# Patient Record
Sex: MALE | Race: WHITE | Employment: FULL TIME | URBAN - METROPOLITAN AREA
[De-identification: names, ages, dates, MRNs, and addresses within clinical notes are randomized per-mention and may not be internally consistent; named-entity substitution may affect disease eponyms.]

---

## 2017-06-15 DIAGNOSIS — I42.8 NONISCHEMIC CARDIOMYOPATHY (HCC): ICD-10-CM

## 2017-06-15 DIAGNOSIS — I10 ESSENTIAL HYPERTENSION: ICD-10-CM

## 2017-06-15 RX ORDER — LISINOPRIL 10 MG/1
TABLET ORAL
Qty: 180 TABLET | Refills: 1 | Status: SHIPPED | OUTPATIENT
Start: 2017-06-15 | End: 2017-12-31 | Stop reason: SDUPTHER

## 2017-08-15 LAB
ALBUMIN SERPL-MCNC: 4.4 G/DL
ALP BLD-CCNC: 58 U/L
ALT SERPL-CCNC: 22 U/L
ANION GAP SERPL CALCULATED.3IONS-SCNC: NORMAL MMOL/L
AST SERPL-CCNC: 17 U/L
AVERAGE GLUCOSE: NORMAL
BILIRUB SERPL-MCNC: 0.6 MG/DL (ref 0.1–1.4)
BUN BLDV-MCNC: 17 MG/DL
CALCIUM SERPL-MCNC: 9.2 MG/DL
CHLORIDE BLD-SCNC: 103 MMOL/L
CHOLESTEROL, TOTAL: 152 MG/DL
CHOLESTEROL/HDL RATIO: NORMAL
CO2: NORMAL MMOL/L
CREAT SERPL-MCNC: 0.77 MG/DL
GFR CALCULATED: NORMAL
GLUCOSE BLD-MCNC: 103 MG/DL
HBA1C MFR BLD: 5.9 %
HCT VFR BLD CALC: 40.8 % (ref 41–53)
HDLC SERPL-MCNC: 48 MG/DL (ref 35–70)
HEMOGLOBIN: 13.9 G/DL (ref 13.5–17.5)
LDL CHOLESTEROL CALCULATED: 79 MG/DL (ref 0–160)
PLATELET # BLD: 274 K/ΜL
POTASSIUM SERPL-SCNC: 5 MMOL/L
SODIUM BLD-SCNC: 143 MMOL/L
TOTAL PROTEIN: 6.8
TRIGL SERPL-MCNC: 126 MG/DL
VLDLC SERPL CALC-MCNC: NORMAL MG/DL
WBC # BLD: 8.5 10^3/ML

## 2017-08-18 ENCOUNTER — TELEPHONE (OUTPATIENT)
Dept: CARDIOLOGY CLINIC | Age: 47
End: 2017-08-18

## 2017-11-17 ENCOUNTER — OFFICE VISIT (OUTPATIENT)
Dept: CARDIOLOGY CLINIC | Age: 47
End: 2017-11-17

## 2017-11-17 VITALS
HEART RATE: 75 BPM | HEIGHT: 72 IN | DIASTOLIC BLOOD PRESSURE: 58 MMHG | WEIGHT: 169 LBS | OXYGEN SATURATION: 96 % | BODY MASS INDEX: 22.89 KG/M2 | SYSTOLIC BLOOD PRESSURE: 92 MMHG

## 2017-11-17 DIAGNOSIS — E78.2 MIXED HYPERLIPIDEMIA: ICD-10-CM

## 2017-11-17 DIAGNOSIS — I10 ESSENTIAL HYPERTENSION: Primary | ICD-10-CM

## 2017-11-17 DIAGNOSIS — I25.10 CORONARY ARTERY DISEASE INVOLVING NATIVE CORONARY ARTERY OF NATIVE HEART WITHOUT ANGINA PECTORIS: ICD-10-CM

## 2017-11-17 DIAGNOSIS — I42.8 NONISCHEMIC CARDIOMYOPATHY (HCC): ICD-10-CM

## 2017-11-17 PROCEDURE — 99214 OFFICE O/P EST MOD 30 MIN: CPT | Performed by: INTERNAL MEDICINE

## 2017-11-17 RX ORDER — VARENICLINE TARTRATE 25 MG
KIT ORAL
Qty: 1 EACH | Refills: 0 | Status: SHIPPED | OUTPATIENT
Start: 2017-11-17

## 2017-11-17 RX ORDER — VARENICLINE TARTRATE 1 MG/1
1 TABLET, FILM COATED ORAL 2 TIMES DAILY
Qty: 60 TABLET | Refills: 3 | Status: SHIPPED | OUTPATIENT
Start: 2017-11-17

## 2017-11-17 NOTE — PROGRESS NOTES
[facility]   Cardiac Followup    Referring Provider:  Ana Lyle MD     Chief Complaint   Patient presents with    Coronary Artery Disease    Hypertension    Hyperlipidemia        History of Present Illness: Follow up nonischemic cardiomyopathy, smoking, HTN. Feels well overall. He continues to smoke and drink alcohol. He does not check his BP at home. Tolerates meds well. Denies CP, syncope, dizziness, palps or SOB. Not exercising much although active on job. Past Medical History:   has a past medical history of Bell's palsy and Seasonal allergies. Surgical History:   has a past surgical history that includes hernia repair; Tonsillectomy; and Sinus endoscopy (7/20/2010). Social History:   reports that he has been smoking Cigarettes. He has a 11.00 pack-year smoking history. He has never used smokeless tobacco. He reports that he drinks about 2.4 oz of alcohol per week . Family History:  family history includes Depression in his father. Home Medications:  Prior to Admission medications    Medication Sig Start Date End Date Taking? Authorizing Provider   lisinopril (PRINIVIL;ZESTRIL) 10 MG tablet TAKE ONE TABLET BY MOUTH TWICE A DAY 6/15/17  Yes Eleni Lucas MD   carvedilol (COREG) 12.5 MG tablet TAKE ONE TABLET BY MOUTH TWICE A DAY WITH MEALS 11/22/16  Yes Eleni Lucas MD   atorvastatin (LIPITOR) 80 MG tablet TAKE 1 TABLET BY MOUTH ONE TIME A DAY 11/22/16  Yes Eleni Lucas MD   aspirin 81 MG tablet Take 81 mg by mouth daily. Yes Historical Provider, MD   Multiple Vitamins-Minerals (THERAPEUTIC MULTIVITAMIN-MINERALS) tablet Take 1 tablet by mouth daily. Yes Historical Provider, MD   Omega-3 Fatty Acids (FISH OIL) 1000 MG CAPS Take 1,000 mg by mouth daily. Yes Historical Provider, MD        Allergies:  Review of patient's allergies indicates no known allergies. Review of Systems:   · Constitutional: there has been no unanticipated weight loss. spheres  · Moves all extremities well  · Exhibits normal gait balance and coordination  · No abnormalities of mood, affect, memory, mentation, or behavior are noted    Cardiac cath 3/2014  LM 20%   LAD 50% mid, FFR 0.88   Cx 20%   RCA <20%   LVEF: global hypokinesis, EF 30-35%   Nonischemic cardiomyopathy    Echo 3/2014  Global ejection fraction is moderately decreased and estimated at 40 %. Abnormal (paradoxical) septal motion is present likely due to left bundle  branch block . No significant valvular abnormalities. Echo 6/13/2014  Normal left ventricle size and wall thickness. The left ventricular systolic function is moderately reduced with an  ejection fraction of 40-45%. There is abnormal (paradoxical) septal motion consistent with left bundle  branch block. Echo 8/2015  Normal left ventricle size and wall thickness. The left ventricular systolic function is mildly reduced with an ejection  fraction of 45 %. Mild global hypokinesis. Diastolic filling parameters suggests normal diastolic function. Systolic pulmonary artery pressure (SPAP) is normal and estimated at 23   MmHg (RA pressure 3 mmHg). Echo 11/2016   Normal left ventricle size, wall thickness and systolic function with an   estimated ejection fraction of 50-55%.   No regional wall motion abnormalities seen.   Diastolic filling parameters suggest normal diastolic filing pressure.   Systolic pulmonary artery pressure (SPAP) is normal and estimated at 24 mmHg   (RA pressure 3 mmHg). Assessment:   1. Nonischemic cardiomyopathy - resolved. EF returned to normal     2. CAD (coronary artery disease) - nonobstructive. No anginal symptoms. stable   3. S/P cardiac catheterization  - stable   4. Chest pain - no recurrence   5. HLD - well controlled. Results reviewed  6. Smoker - cessation again discussed. Plan:   Will prescribe chantix   Recommend abstaining from alcohol and tobacco   Continue exercising and following a healthy

## 2017-11-17 NOTE — LETTER
[facility] - [address]  Phone: [phone]  Fax: [phone]    Marli Araujo MD        November 20, 2017     Edd Chino MD  [place] [address], [address]    Patient: Edgard Iverson  MR Number: [id number]  YOB: 1970  Date of Visit: 11/17/2017    Dear Dr. Edd Chino:      Torito Evans    Referring Provider:  Edd Chino MD     Chief Complaint   Patient presents with    Coronary Artery Disease    Hypertension    Hyperlipidemia        History of Present Illness: Follow up nonischemic cardiomyopathy, smoking, HTN. Feels well overall. He continues to smoke and drink alcohol. He does not check his BP at home. Tolerates meds well. Denies CP, syncope, dizziness, palps or SOB. Not exercising much although active on job. Past Medical History:   has a past medical history of Bell's palsy and Seasonal allergies. Surgical History:   has a past surgical history that includes hernia repair; Tonsillectomy; and Sinus endoscopy (7/20/2010). Social History:   reports that he has been smoking Cigarettes. He has a 11.00 pack-year smoking history. He has never used smokeless tobacco. He reports that he drinks about 2.4 oz of alcohol per week . Family History:  family history includes Depression in his father. Home Medications:  Prior to Admission medications    Medication Sig Start Date End Date Taking? Authorizing Provider   lisinopril (PRINIVIL;ZESTRIL) 10 MG tablet TAKE ONE TABLET BY MOUTH TWICE A DAY 6/15/17  Yes Marli Araujo MD   carvedilol (COREG) 12.5 MG tablet TAKE ONE TABLET BY MOUTH TWICE A DAY WITH MEALS 11/22/16  Yes Marli Araujo MD   atorvastatin (LIPITOR) 80 MG tablet TAKE 1 TABLET BY MOUTH ONE TIME A DAY 11/22/16  Yes Marli Araujo MD   aspirin 81 MG tablet Take 81 mg by mouth daily.    Yes Historical Provider, MD · The carotid upstroke is normal in amplitude and contour without delay or bruit  · Normal S1S2, No S3, No Murmur  · Peripheral pulses are symmetrical and full  · There is no clubbing, cyanosis of the extremities. · No edema  · Femoral Arteries: 2+ and equal  · Pedal Pulses: 2+ and equal   Abdomen:  · No masses or tenderness  · Liver/Spleen: No Abnormalities Noted  Neurological/Psychiatric:  · Alert and oriented in all spheres  · Moves all extremities well  · Exhibits normal gait balance and coordination  · No abnormalities of mood, affect, memory, mentation, or behavior are noted    Cardiac cath 3/2014  LM 20%   LAD 50% mid, FFR 0.88   Cx 20%   RCA <20%   LVEF: global hypokinesis, EF 30-35%   Nonischemic cardiomyopathy    Echo 3/2014  Global ejection fraction is moderately decreased and estimated at 40 %. Abnormal (paradoxical) septal motion is present likely due to left bundle  branch block . No significant valvular abnormalities. Echo 6/13/2014  Normal left ventricle size and wall thickness. The left ventricular systolic function is moderately reduced with an  ejection fraction of 40-45%. There is abnormal (paradoxical) septal motion consistent with left bundle  branch block. Echo 8/2015  Normal left ventricle size and wall thickness. The left ventricular systolic function is mildly reduced with an ejection  fraction of 45 %. Mild global hypokinesis. Diastolic filling parameters suggests normal diastolic function. Systolic pulmonary artery pressure (SPAP) is normal and estimated at 23   MmHg (RA pressure 3 mmHg). Echo 11/2016   Normal left ventricle size, wall thickness and systolic function with an   estimated ejection fraction of 50-55%.   No regional wall motion abnormalities seen.   Diastolic filling parameters suggest normal diastolic filing pressure.   Systolic pulmonary artery pressure (SPAP) is normal and estimated at 24 mmHg   (RA pressure 3 mmHg).     Assessment: 1. Nonischemic cardiomyopathy - resolved. EF returned to normal     2. CAD (coronary artery disease) - nonobstructive. No anginal symptoms. stable   3. S/P cardiac catheterization  - stable   4. Chest pain - no recurrence   5. HLD - well controlled. Results reviewed  6. Smoker - cessation again discussed. Plan: Will prescribe chantix   Recommend abstaining from alcohol and tobacco   Continue exercising and following a healthy diet  Continue current medication regimen. No titration due to low BP. Monitor BP at home  F/U with me in 1 year       Esperanza Will. Magaly Merrill M.D., Jesus Aleman        If you have questions, please do not hesitate to call me. I look forward to following [facility] along with you.     Sincerely,        Marli Araujo MD

## 2017-11-20 NOTE — COMMUNICATION BODY
[address]   Cardiac Followup    Referring Provider:  Bhargav Santo MD     Chief Complaint   Patient presents with    Coronary Artery Disease    Hypertension    Hyperlipidemia        History of Present Illness: Follow up nonischemic cardiomyopathy, smoking, HTN. Feels well overall. He continues to smoke and drink alcohol. He does not check his BP at home. Tolerates meds well. Denies CP, syncope, dizziness, palps or SOB. Not exercising much although active on job. Past Medical History:   has a past medical history of Bell's palsy and Seasonal allergies. Surgical History:   has a past surgical history that includes hernia repair; Tonsillectomy; and Sinus endoscopy (7/20/2010). Social History:   reports that he has been smoking Cigarettes. He has a 11.00 pack-year smoking history. He has never used smokeless tobacco. He reports that he drinks about 2.4 oz of alcohol per week . Family History:  family history includes Depression in his father. Home Medications:  Prior to Admission medications    Medication Sig Start Date End Date Taking? Authorizing Provider   lisinopril (PRINIVIL;ZESTRIL) 10 MG tablet TAKE ONE TABLET BY MOUTH TWICE A DAY 6/15/17  Yes Barry Collazo MD   carvedilol (COREG) 12.5 MG tablet TAKE ONE TABLET BY MOUTH TWICE A DAY WITH MEALS 11/22/16  Yes Barry Collazo MD   atorvastatin (LIPITOR) 80 MG tablet TAKE 1 TABLET BY MOUTH ONE TIME A DAY 11/22/16  Yes Barry Collazo MD   aspirin 81 MG tablet Take 81 mg by mouth daily. Yes Historical Provider, MD   Multiple Vitamins-Minerals (THERAPEUTIC MULTIVITAMIN-MINERALS) tablet Take 1 tablet by mouth daily. Yes Historical Provider, MD   Omega-3 Fatty Acids (FISH OIL) 1000 MG CAPS Take 1,000 mg by mouth daily. Yes Historical Provider, MD        Allergies:  Review of patient's allergies indicates no known allergies. Review of Systems:   · Constitutional: there has been no unanticipated weight loss. diet  Continue current medication regimen. No titration due to low BP. Monitor BP at home  F/U with me in 1 year       Kirstie Jo.  Winnie Watters M.D., Arnol San

## 2017-12-07 DIAGNOSIS — I42.8 NONISCHEMIC CARDIOMYOPATHY (HCC): ICD-10-CM

## 2017-12-08 RX ORDER — CARVEDILOL 12.5 MG/1
TABLET ORAL
Qty: 180 TABLET | Refills: 3 | Status: SHIPPED | OUTPATIENT
Start: 2017-12-08 | End: 2018-12-17 | Stop reason: SDUPTHER

## 2017-12-31 DIAGNOSIS — I10 ESSENTIAL HYPERTENSION: ICD-10-CM

## 2017-12-31 DIAGNOSIS — I42.8 NONISCHEMIC CARDIOMYOPATHY (HCC): ICD-10-CM

## 2018-01-02 RX ORDER — LISINOPRIL 10 MG/1
TABLET ORAL
Qty: 180 TABLET | Refills: 3 | Status: SHIPPED | OUTPATIENT
Start: 2018-01-02 | End: 2019-02-20 | Stop reason: SDUPTHER

## 2018-01-17 RX ORDER — ATORVASTATIN CALCIUM 80 MG/1
TABLET, FILM COATED ORAL
Qty: 90 TABLET | Refills: 3 | Status: SHIPPED | OUTPATIENT
Start: 2018-01-17 | End: 2019-03-05 | Stop reason: SDUPTHER

## 2018-02-28 ENCOUNTER — TELEPHONE (OUTPATIENT)
Dept: CARDIOLOGY CLINIC | Age: 48
End: 2018-02-28

## 2018-12-17 DIAGNOSIS — I42.8 NONISCHEMIC CARDIOMYOPATHY (HCC): ICD-10-CM

## 2018-12-17 RX ORDER — CARVEDILOL 12.5 MG/1
TABLET ORAL
Qty: 60 TABLET | Refills: 0 | Status: SHIPPED | OUTPATIENT
Start: 2018-12-17 | End: 2019-01-21 | Stop reason: SDUPTHER

## 2019-01-09 ENCOUNTER — OFFICE VISIT (OUTPATIENT)
Dept: CARDIOLOGY CLINIC | Age: 49
End: 2019-01-09
Payer: COMMERCIAL

## 2019-01-09 VITALS
BODY MASS INDEX: 25.8 KG/M2 | SYSTOLIC BLOOD PRESSURE: 100 MMHG | OXYGEN SATURATION: 97 % | HEART RATE: 75 BPM | DIASTOLIC BLOOD PRESSURE: 60 MMHG | WEIGHT: 190.2 LBS

## 2019-01-09 DIAGNOSIS — I42.8 NONISCHEMIC CARDIOMYOPATHY (HCC): ICD-10-CM

## 2019-01-09 DIAGNOSIS — I25.10 CORONARY ARTERY DISEASE INVOLVING NATIVE CORONARY ARTERY OF NATIVE HEART WITHOUT ANGINA PECTORIS: Primary | ICD-10-CM

## 2019-01-09 DIAGNOSIS — E78.2 MIXED HYPERLIPIDEMIA: ICD-10-CM

## 2019-01-09 DIAGNOSIS — I10 ESSENTIAL HYPERTENSION: ICD-10-CM

## 2019-01-09 PROCEDURE — 99214 OFFICE O/P EST MOD 30 MIN: CPT | Performed by: INTERNAL MEDICINE

## 2019-01-21 DIAGNOSIS — I42.8 NONISCHEMIC CARDIOMYOPATHY (HCC): ICD-10-CM

## 2019-01-22 RX ORDER — CARVEDILOL 12.5 MG/1
TABLET ORAL
Qty: 60 TABLET | Refills: 3 | Status: SHIPPED | OUTPATIENT
Start: 2019-01-22 | End: 2019-06-05 | Stop reason: SDUPTHER

## 2019-01-30 ENCOUNTER — TELEPHONE (OUTPATIENT)
Dept: CARDIOLOGY CLINIC | Age: 49
End: 2019-01-30

## 2019-02-20 ENCOUNTER — TELEPHONE (OUTPATIENT)
Dept: CARDIOLOGY CLINIC | Age: 49
End: 2019-02-20

## 2019-02-20 ENCOUNTER — HOSPITAL ENCOUNTER (OUTPATIENT)
Dept: CARDIOLOGY | Age: 49
Discharge: HOME | End: 2019-02-20
Payer: COMMERCIAL

## 2019-02-20 DIAGNOSIS — I42.8 NONISCHEMIC CARDIOMYOPATHY (HCC): ICD-10-CM

## 2019-02-20 DIAGNOSIS — I10 ESSENTIAL HYPERTENSION: ICD-10-CM

## 2019-02-20 LAB
LV EF: 53 %
LVEF MODALITY: NORMAL

## 2019-02-20 PROCEDURE — 93306 TTE W/DOPPLER COMPLETE: CPT

## 2019-02-20 RX ORDER — LISINOPRIL 10 MG/1
TABLET ORAL
Qty: 180 TABLET | Refills: 3 | Status: SHIPPED | OUTPATIENT
Start: 2019-02-20 | End: 2020-03-23

## 2019-02-25 ENCOUNTER — TELEPHONE (OUTPATIENT)
Dept: CARDIOLOGY CLINIC | Age: 49
End: 2019-02-25

## 2019-03-06 RX ORDER — ATORVASTATIN CALCIUM 80 MG/1
TABLET, FILM COATED ORAL
Qty: 90 TABLET | Refills: 3 | Status: SHIPPED | OUTPATIENT
Start: 2019-03-06 | End: 2020-01-16 | Stop reason: SDUPTHER

## 2019-06-05 DIAGNOSIS — I42.8 NONISCHEMIC CARDIOMYOPATHY (HCC): ICD-10-CM

## 2019-06-11 RX ORDER — CARVEDILOL 12.5 MG/1
TABLET ORAL
Qty: 180 TABLET | Refills: 2 | Status: SHIPPED | OUTPATIENT
Start: 2019-06-11 | End: 2020-04-07

## 2020-01-16 ENCOUNTER — OFFICE VISIT (OUTPATIENT)
Dept: CARDIOLOGY CLINIC | Age: 50
End: 2020-01-16
Payer: COMMERCIAL

## 2020-01-16 VITALS
HEART RATE: 87 BPM | BODY MASS INDEX: 26.76 KG/M2 | OXYGEN SATURATION: 97 % | SYSTOLIC BLOOD PRESSURE: 100 MMHG | DIASTOLIC BLOOD PRESSURE: 68 MMHG | HEIGHT: 72 IN | WEIGHT: 197.6 LBS

## 2020-01-16 PROCEDURE — 99214 OFFICE O/P EST MOD 30 MIN: CPT | Performed by: INTERNAL MEDICINE

## 2020-01-16 RX ORDER — ATORVASTATIN CALCIUM 80 MG/1
TABLET, FILM COATED ORAL
Qty: 90 TABLET | Refills: 3 | Status: SHIPPED | OUTPATIENT
Start: 2020-01-16 | End: 2021-01-11 | Stop reason: SDUPTHER

## 2020-01-16 NOTE — PROGRESS NOTES
varenicline (CHANTIX STARTING MONTH DESHAWN) 0.5 MG X 11 & 1 MG X 42 tablet Take by mouth. Patient not taking: Reported on 1/16/2020 11/17/17   Tori Cintron MD   varenicline (CHANTIX CONTINUING MONTH DESHAWN) 1 MG tablet Take 1 tablet by mouth 2 times daily  Patient not taking: Reported on 1/16/2020 11/17/17   Tori Cintron MD        Allergies:  Patient has no known allergies. Review of Systems:   · Constitutional: there has been no unanticipated weight loss. There's been no change in energy level, sleep pattern, or activity level. · Eyes: No visual changes or diplopia. No scleral icterus. · ENT: No Headaches, hearing loss or vertigo. No mouth sores or sore throat. · Cardiovascular: Reviewed in HPI  · Respiratory: No cough or wheezing, no sputum production. No hematemesis. · Gastrointestinal: No abdominal pain, appetite loss, blood in stools. No change in bowel or bladder habits. · Genitourinary: No dysuria, trouble voiding, or hematuria. · Musculoskeletal:  No gait disturbance, weakness or joint complaints. · Integumentary: No rash or pruritis. · Neurological: No headache, diplopia, change in muscle strength, numbness or tingling. No change in gait, balance, coordination, mood, affect, memory, mentation, behavior. · Psychiatric: No anxiety, no depression. · Endocrine: No malaise, fatigue or temperature intolerance. No excessive thirst, fluid intake, or urination. No tremor. · Hematologic/Lymphatic: No abnormal bruising or bleeding, blood clots or swollen lymph nodes. · Allergic/Immunologic: No nasal congestion or hives.     Physical Examination:    Vitals:    01/16/20 1519   BP: 100/68   Pulse: 87   SpO2: 97%        Constitutional and General Appearance: NAD   Respiratory:  · Normal excursion and expansion without use of accessory muscles  · Resp Auscultation: Normal breath sounds without dullness  Cardiovascular:  · The apical impulses not displaced  · Heart tones are crisp and normal  · Cervical veins are not engorged  · The carotid upstroke is normal in amplitude and contour without delay or bruit  · Normal S1S2, No S3, No Murmur  · Peripheral pulses are symmetrical and full  · There is no clubbing, cyanosis of the extremities. · No edema  · Femoral Arteries: 2+ and equal  · Pedal Pulses: 2+ and equal   Abdomen:  · No masses or tenderness  · Liver/Spleen: No Abnormalities Noted  Neurological/Psychiatric:  · Alert and oriented in all spheres  · Moves all extremities well  · Exhibits normal gait balance and coordination  · No abnormalities of mood, affect, memory, mentation, or behavior are noted    Cardiac cath 3/2014  LM 20%   LAD 50% mid, FFR 0.88   Cx 20%   RCA <20%   LVEF: global hypokinesis, EF 30-35%   Nonischemic cardiomyopathy    Echo 3/2014  Global ejection fraction is moderately decreased and estimated at 40 %. Abnormal (paradoxical) septal motion is present likely due to left bundle  branch block . No significant valvular abnormalities. Echo 6/13/2014  Normal left ventricle size and wall thickness. The left ventricular systolic function is moderately reduced with an  ejection fraction of 40-45%. There is abnormal (paradoxical) septal motion consistent with left bundle  branch block. Echo 8/2015  Normal left ventricle size and wall thickness. The left ventricular systolic function is mildly reduced with an ejection  fraction of 45 %. Mild global hypokinesis. Diastolic filling parameters suggests normal diastolic function. Systolic pulmonary artery pressure (SPAP) is normal and estimated at 23   MmHg (RA pressure 3 mmHg).     Echo 11/2016   Normal left ventricle size, wall thickness and systolic function with an   estimated ejection fraction of 50-55%.   No regional wall motion abnormalities seen.   Diastolic filling parameters suggest normal diastolic filing pressure.   Systolic pulmonary artery pressure (SPAP) is normal and estimated at 24 mmHg   (RA pressure 3 mmHg).    Echocardiogram 2/20/19  Summary   Lower normal LV systolic function with an estimated EF of 50-55%. EF by Emerson's method estimated at 53%. No regional wall motion abnormalities are seen. Normal left ventricular diastolic filling pressure. The right atrium is mildly dilated. Systolic pulmonary artery pressure (SPAP) is normal and estimated at 20mmHg   (right atrial pressure 3mmHg). Assessment:   1. Nonischemic cardiomyopathy - resolved. EF returned to normal. stable     2. CAD (coronary artery disease) - nonobstructive. No anginal symptoms. stable   3. S/P cardiac catheterization  - stable   4. Chest pain - no recurrence   5. HLD - well controlled. He will get results from employer to us. 6.  Smoker - stopped 6 months ago. Cont cessation discussed      Plan:  Continue current medications   Check blood pressure at home weekly  Regular exercise and following a healthy diet encouraged   Follow up with me in 1 year     Scribe's attestation: This note was scribed in the presence of Dr. Arnoldo Hernandez M.D. By Lynnette Hyde RN    The scribes documentation has been prepared under my direction and personally reviewed by me in its entirety. I confirm that the note above accurately reflects all work, treatment, procedures, and medical decision making performed by me. Dr. Arnoldo Hernandez MD        [facility].  Viridiana Lockett M.D., [address]

## 2020-01-31 ENCOUNTER — TELEPHONE (OUTPATIENT)
Dept: CARDIOLOGY CLINIC | Age: 50
End: 2020-01-31

## 2020-01-31 NOTE — TELEPHONE ENCOUNTER
CARDIAC CLEARANCE REQUEST    What type of procedure are you having: cyst removed off neck    Are you taking any blood thinners:  Aspirin & Fish Oil    When is your procedure scheduled for:  End of February    What physician is performing your procedure:  Mateus Rivera    Phone Number:  [phone]    Fax number to send the letter:   [phone] [facility] [phone]

## 2020-02-03 NOTE — TELEPHONE ENCOUNTER
The patient's cardiac condition is not prohibitory to undergo surgery. The patient's cardiac risk is moderate based upon my evaluation and testing. Stable to proceed. He has non-ischemic CMP. No stents. Okay to hold aspirin.

## 2020-03-23 RX ORDER — LISINOPRIL 10 MG/1
TABLET ORAL
Qty: 180 TABLET | Refills: 2 | Status: SHIPPED | OUTPATIENT
Start: 2020-03-23 | End: 2021-01-11 | Stop reason: SDUPTHER

## 2020-04-07 RX ORDER — CARVEDILOL 12.5 MG/1
TABLET ORAL
Qty: 180 TABLET | Refills: 3 | Status: SHIPPED | OUTPATIENT
Start: 2020-04-07 | End: 2021-01-11 | Stop reason: SDUPTHER

## 2021-01-04 ENCOUNTER — TELEPHONE (OUTPATIENT)
Dept: CARDIOLOGY CLINIC | Age: 51
End: 2021-01-04

## 2021-01-08 NOTE — PROGRESS NOTES
aspirin 81 MG tablet Take 81 mg by mouth daily. Yes Historical Provider, MD   Multiple Vitamins-Minerals (THERAPEUTIC MULTIVITAMIN-MINERALS) tablet Take 1 tablet by mouth daily. Yes Historical Provider, MD   Omega-3 Fatty Acids (FISH OIL) 1000 MG CAPS Take 1,000 mg by mouth daily. Yes Historical Provider, MD   varenicline (CHANTIX STARTING MONTH DESHAWN) 0.5 MG X 11 & 1 MG X 42 tablet Take by mouth. Patient not taking: Reported on 1/16/2020 11/17/17   Dejah Novak MD   varenicline (CHANTIX CONTINUING MONTH PAK) 1 MG tablet Take 1 tablet by mouth 2 times daily  Patient not taking: Reported on 1/16/2020 11/17/17   Dejah Novak MD        Allergies:  Patient has no known allergies. Review of Systems:   · Constitutional: there has been no unanticipated weight loss. There's been no change in energy level, sleep pattern, or activity level. · Eyes: No visual changes or diplopia. No scleral icterus. · ENT: No Headaches, hearing loss or vertigo. No mouth sores or sore throat. · Cardiovascular: Reviewed in HPI  · Respiratory: No cough or wheezing, no sputum production. No hematemesis. · Gastrointestinal: No abdominal pain, appetite loss, blood in stools. No change in bowel or bladder habits. · Genitourinary: No dysuria, trouble voiding, or hematuria. · Musculoskeletal:  No gait disturbance, weakness or joint complaints. · Integumentary: No rash or pruritis. · Neurological: No headache, diplopia, change in muscle strength, numbness or tingling. No change in gait, balance, coordination, mood, affect, memory, mentation, behavior. · Psychiatric: No anxiety, no depression. · Endocrine: No malaise, fatigue or temperature intolerance. No excessive thirst, fluid intake, or urination. No tremor. · Hematologic/Lymphatic: No abnormal bruising or bleeding, blood clots or swollen lymph nodes. · Allergic/Immunologic: No nasal congestion or hives.     Physical Examination:    Vitals:    01/11/21 0910 MmHg (RA pressure 3 mmHg). Echo 11/2016   Normal left ventricle size, wall thickness and systolic function with an   estimated ejection fraction of 50-55%.   No regional wall motion abnormalities seen.   Diastolic filling parameters suggest normal diastolic filing pressure.   Systolic pulmonary artery pressure (SPAP) is normal and estimated at 24 mmHg   (RA pressure 3 mmHg). Echocardiogram 2/20/19  Summary   Lower normal LV systolic function with an estimated EF of 50-55%. EF by Emerson's method estimated at 53%. No regional wall motion abnormalities are seen. Normal left ventricular diastolic filling pressure. The right atrium is mildly dilated. Systolic pulmonary artery pressure (SPAP) is normal and estimated at 20mmHg   (right atrial pressure 3mmHg). Assessment:   1. Nonischemic cardiomyopathy - resolved. EF returned to normal. No recent eval    2. CAD (coronary artery disease) - nonobstructive. No anginal symptoms. stable   3. S/P cardiac catheterization  - stable   4. Chest pain - no recurrence   5. HLD - well controlled. Results reviewed  6. Smoker - stopped 6 months ago. 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 Celeste Smith M.D. By Homero Mondragon RN    The scribes documentation has been prepared under my direction and personally reviewed by me in its entirety. I confirm that the note above accurately reflects all work, treatment, procedures, and medical decision making performed by me. MD Brandan Marie.  Wood Martin M.D., Brandyn Suárez

## 2021-01-11 ENCOUNTER — OFFICE VISIT (OUTPATIENT)
Dept: CARDIOLOGY CLINIC | Age: 51
End: 2021-01-11
Payer: COMMERCIAL

## 2021-01-11 VITALS
WEIGHT: 199 LBS | SYSTOLIC BLOOD PRESSURE: 110 MMHG | HEART RATE: 70 BPM | BODY MASS INDEX: 26.95 KG/M2 | DIASTOLIC BLOOD PRESSURE: 74 MMHG | HEIGHT: 72 IN | OXYGEN SATURATION: 95 %

## 2021-01-11 DIAGNOSIS — I42.8 NONISCHEMIC CARDIOMYOPATHY (HCC): ICD-10-CM

## 2021-01-11 DIAGNOSIS — E78.2 MIXED HYPERLIPIDEMIA: ICD-10-CM

## 2021-01-11 DIAGNOSIS — I25.10 CORONARY ARTERY DISEASE INVOLVING NATIVE CORONARY ARTERY OF NATIVE HEART WITHOUT ANGINA PECTORIS: Primary | ICD-10-CM

## 2021-01-11 DIAGNOSIS — I10 ESSENTIAL HYPERTENSION: ICD-10-CM

## 2021-01-11 PROCEDURE — 99214 OFFICE O/P EST MOD 30 MIN: CPT | Performed by: INTERNAL MEDICINE

## 2021-01-11 RX ORDER — LISINOPRIL 10 MG/1
TABLET ORAL
Qty: 180 TABLET | Refills: 3 | Status: SHIPPED | OUTPATIENT
Start: 2021-01-11 | End: 2022-01-18 | Stop reason: SDUPTHER

## 2021-01-11 RX ORDER — ATORVASTATIN CALCIUM 80 MG/1
TABLET, FILM COATED ORAL
Qty: 90 TABLET | Refills: 3 | Status: SHIPPED | OUTPATIENT
Start: 2021-01-11 | End: 2022-01-18 | Stop reason: SDUPTHER

## 2021-01-11 RX ORDER — CARVEDILOL 12.5 MG/1
TABLET ORAL
Qty: 180 TABLET | Refills: 3 | Status: SHIPPED | OUTPATIENT
Start: 2021-01-11 | End: 2022-01-18 | Stop reason: SDUPTHER

## 2021-01-11 NOTE — PATIENT INSTRUCTIONS
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     Echocardiogram   Fasting lipids and CMP  Continue current medications   Check blood pressure at home weekly  Regular exercise and following a healthy diet encouraged   Follow up with me in 1 year

## 2021-01-11 NOTE — LETTER
[address]   Cardiac Followup    Referring Provider:  Doroteo Gupta MD     Chief Complaint   Patient presents with    1 Year Follow Up     DOT clearance    Hyperlipidemia    Hypertension    Coronary Artery Disease    Cardiomyopathy        History of Present Illness:   Lili Ritter is a 45 y.o. male who is here today for follow up for a history of coronary artery disease- non obstructive by cath 2014, nonischemic cardiomyopathy- EF 30-35% by cath, MUGA 2014 42.6%,  improved to 50-55% by echocardiogram in 2019, hypertension, and hyperlipidemia. Today he states he has been feeling well. Here for DOT clearance. He has not been checking his blood pressure at home. He is tolerating his medications. He states he has been staying active w/o symptoms. Patient currently denies any weight gain, edema, palpitations, chest pain, shortness of breath, dizziness, and syncope. Past Medical History:   has a past medical history of Bell's palsy and Seasonal allergies. Surgical History:   has a past surgical history that includes hernia repair; Tonsillectomy; and Sinus endoscopy (7/20/2010). Social History:   reports that he quit smoking about 2 years ago. His smoking use included cigarettes. He has a 11.00 pack-year smoking history. He has never used smokeless tobacco. He reports current alcohol use of about 4.0 standard drinks of alcohol per week. Family History:  family history includes Depression in his father. Home Medications:  Prior to Admission medications    Medication Sig Start Date End Date Taking?  Authorizing Provider   carvedilol (COREG) 12.5 MG tablet TAKE ONE TABLET BY MOUTH TWICE A DAY WITH MEALS 4/7/20  Yes Dinora Chávez MD   lisinopril (PRINIVIL;ZESTRIL) 10 MG tablet TAKE ONE TABLET BY MOUTH TWICE A DAY 3/23/20  Yes Dinora Chávez MD   atorvastatin (LIPITOR) 80 MG tablet TAKE 1 TABLET BY MOUTH ONE TIME A DAY 1/16/20  Yes Dinora Chávez MD aspirin 81 MG tablet Take 81 mg by mouth daily. Yes Historical Provider, MD   Multiple Vitamins-Minerals (THERAPEUTIC MULTIVITAMIN-MINERALS) tablet Take 1 tablet by mouth daily. Yes Historical Provider, MD   Omega-3 Fatty Acids (FISH OIL) 1000 MG CAPS Take 1,000 mg by mouth daily. Yes Historical Provider, MD   varenicline (CHANTIX STARTING MONTH DESHAWN) 0.5 MG X 11 & 1 MG X 42 tablet Take by mouth. Patient not taking: Reported on 1/16/2020 11/17/17   Kenyetta Villegas MD   varenicline (CHANTIX CONTINUING MONTH DESHAWN) 1 MG tablet Take 1 tablet by mouth 2 times daily  Patient not taking: Reported on 1/16/2020 11/17/17   Kenyetta Villegas MD        Allergies:  Patient has no known allergies. Review of Systems:   · Constitutional: there has been no unanticipated weight loss. There's been no change in energy level, sleep pattern, or activity level. · Eyes: No visual changes or diplopia. No scleral icterus. · ENT: No Headaches, hearing loss or vertigo. No mouth sores or sore throat. · Cardiovascular: Reviewed in HPI  · Respiratory: No cough or wheezing, no sputum production. No hematemesis. · Gastrointestinal: No abdominal pain, appetite loss, blood in stools. No change in bowel or bladder habits. · Genitourinary: No dysuria, trouble voiding, or hematuria. · Musculoskeletal:  No gait disturbance, weakness or joint complaints. · Integumentary: No rash or pruritis. · Neurological: No headache, diplopia, change in muscle strength, numbness or tingling. No change in gait, balance, coordination, mood, affect, memory, mentation, behavior. · Psychiatric: No anxiety, no depression. · Endocrine: No malaise, fatigue or temperature intolerance. No excessive thirst, fluid intake, or urination. No tremor. · Hematologic/Lymphatic: No abnormal bruising or bleeding, blood clots or swollen lymph nodes. · Allergic/Immunologic: No nasal congestion or hives.     Physical Examination:    Vitals:    01/11/21 0910 BP: 110/74   Pulse: 70   SpO2: 95%        Constitutional and General Appearance: NAD   Respiratory:  · Normal excursion and expansion without use of accessory muscles  · Resp Auscultation: Normal breath sounds without dullness  Cardiovascular:  · The apical impulses not displaced  · Heart tones are crisp and normal  · Cervical veins are not engorged  · The carotid upstroke is normal in amplitude and contour without delay or bruit  · Normal S1S2, No S3, No Murmur  · Peripheral pulses are symmetrical and full  · There is no clubbing, cyanosis of the extremities. · No edema  · Femoral Arteries: 2+ and equal  · Pedal Pulses: 2+ and equal   Abdomen:  · No masses or tenderness  · Liver/Spleen: No Abnormalities Noted  Neurological/Psychiatric:  · Alert and oriented in all spheres  · Moves all extremities well  · Exhibits normal gait balance and coordination  · No abnormalities of mood, affect, memory, mentation, or behavior are noted    Cardiac cath 3/2014  LM 20%   LAD 50% mid, FFR 0.88   Cx 20%   RCA <20%   LVEF: global hypokinesis, EF 30-35%   Nonischemic cardiomyopathy    Echo 3/2014  Global ejection fraction is moderately decreased and estimated at 40 %. Abnormal (paradoxical) septal motion is present likely due to left bundle  branch block . No significant valvular abnormalities. MUGA scan 3/4/14  Ejection fraction measures 42.6%       Echo 6/13/2014  Normal left ventricle size and wall thickness. The left ventricular systolic function is moderately reduced with an  ejection fraction of 40-45%. There is abnormal (paradoxical) septal motion consistent with left bundle  branch block. Echo 8/2015  Normal left ventricle size and wall thickness. The left ventricular systolic function is mildly reduced with an ejection  fraction of 45 %. Mild global hypokinesis. Diastolic filling parameters suggests normal diastolic function.   Systolic pulmonary artery pressure (SPAP) is normal and estimated at 23 MmHg (RA pressure 3 mmHg). Echo 11/2016   Normal left ventricle size, wall thickness and systolic function with an   estimated ejection fraction of 50-55%.   No regional wall motion abnormalities seen.   Diastolic filling parameters suggest normal diastolic filing pressure.   Systolic pulmonary artery pressure (SPAP) is normal and estimated at 24 mmHg   (RA pressure 3 mmHg). Echocardiogram 2/20/19  Summary   Lower normal LV systolic function with an estimated EF of 50-55%. EF by Emerson's method estimated at 53%. No regional wall motion abnormalities are seen. Normal left ventricular diastolic filling pressure. The right atrium is mildly dilated. Systolic pulmonary artery pressure (SPAP) is normal and estimated at 20mmHg   (right atrial pressure 3mmHg). Assessment:   1. Nonischemic cardiomyopathy - resolved. EF returned to normal. No recent eval    2. CAD (coronary artery disease) - nonobstructive. No anginal symptoms. stable   3. S/P cardiac catheterization  - stable   4. Chest pain - no recurrence   5. HLD - well controlled. Results reviewed  6. Smoker - stopped 6 months ago. 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 Que Farnsworth M.D. By Ten Johnson RN    The scribes documentation has been prepared under my direction and personally reviewed by me in its entirety. I confirm that the note above accurately reflects all work, treatment, procedures, and medical decision making performed by me. MD Edwin Vigil.  Nithya Acevedo M.D., Janet Whitmore

## 2021-02-23 ENCOUNTER — HOSPITAL ENCOUNTER (OUTPATIENT)
Dept: NON INVASIVE DIAGNOSTICS | Age: 51
Discharge: HOME | End: 2021-02-23
Payer: COMMERCIAL

## 2021-02-23 ENCOUNTER — TELEPHONE (OUTPATIENT)
Dept: CARDIOLOGY CLINIC | Age: 51
End: 2021-02-23

## 2021-02-23 DIAGNOSIS — I42.8 NONISCHEMIC CARDIOMYOPATHY (HCC): ICD-10-CM

## 2021-02-23 DIAGNOSIS — I25.10 CORONARY ARTERY DISEASE INVOLVING NATIVE CORONARY ARTERY OF NATIVE HEART WITHOUT ANGINA PECTORIS: ICD-10-CM

## 2021-02-23 LAB
ALBUMIN SERPL-MCNC: 4.4 G/DL
ALP BLD-CCNC: 60 U/L
ALT SERPL-CCNC: 28 U/L
ANION GAP SERPL CALCULATED.3IONS-SCNC: NORMAL MMOL/L
AST SERPL-CCNC: 22 U/L
BILIRUB SERPL-MCNC: 0.3 MG/DL (ref 0.1–1.4)
BUN BLDV-MCNC: 21 MG/DL
CALCIUM SERPL-MCNC: 9.8 MG/DL
CHLORIDE BLD-SCNC: 105 MMOL/L
CHOLESTEROL, TOTAL: 180 MG/DL
CHOLESTEROL/HDL RATIO: NORMAL
CO2: 22 MMOL/L
CREAT SERPL-MCNC: 0.84 MG/DL
GFR CALCULATED: NORMAL
GLUCOSE BLD-MCNC: 100 MG/DL
HDLC SERPL-MCNC: 52 MG/DL (ref 35–70)
LDL CHOLESTEROL CALCULATED: 103 MG/DL (ref 0–160)
LV EF: 53 %
LVEF MODALITY: NORMAL
NONHDLC SERPL-MCNC: NORMAL MG/DL
POTASSIUM SERPL-SCNC: 4.6 MMOL/L
SODIUM BLD-SCNC: 139 MMOL/L
TOTAL PROTEIN: 6.9
TRIGL SERPL-MCNC: 141 MG/DL
VLDLC SERPL CALC-MCNC: 25 MG/DL

## 2021-02-23 PROCEDURE — 93306 TTE W/DOPPLER COMPLETE: CPT

## 2021-02-23 NOTE — TELEPHONE ENCOUNTER
----- Message from Musa Hairston MD sent at 2/23/2021  4:19 PM EST -----  Call  Echo looks good  Normal heart fxn

## 2022-01-18 ENCOUNTER — OFFICE VISIT (OUTPATIENT)
Dept: CARDIOLOGY CLINIC | Age: 52
End: 2022-01-18
Payer: COMMERCIAL

## 2022-01-18 VITALS
BODY MASS INDEX: 26.61 KG/M2 | SYSTOLIC BLOOD PRESSURE: 114 MMHG | TEMPERATURE: 98.3 F | DIASTOLIC BLOOD PRESSURE: 70 MMHG | HEIGHT: 72 IN | HEART RATE: 77 BPM | OXYGEN SATURATION: 96 % | WEIGHT: 196.5 LBS

## 2022-01-18 DIAGNOSIS — E78.2 MIXED HYPERLIPIDEMIA: ICD-10-CM

## 2022-01-18 DIAGNOSIS — I10 PRIMARY HYPERTENSION: ICD-10-CM

## 2022-01-18 DIAGNOSIS — Z98.890 S/P CARDIAC CATHETERIZATION: ICD-10-CM

## 2022-01-18 DIAGNOSIS — I25.10 CORONARY ARTERY DISEASE INVOLVING NATIVE CORONARY ARTERY OF NATIVE HEART WITHOUT ANGINA PECTORIS: Primary | ICD-10-CM

## 2022-01-18 DIAGNOSIS — I10 ESSENTIAL HYPERTENSION: ICD-10-CM

## 2022-01-18 DIAGNOSIS — I42.8 NONISCHEMIC CARDIOMYOPATHY (HCC): ICD-10-CM

## 2022-01-18 PROCEDURE — 99214 OFFICE O/P EST MOD 30 MIN: CPT | Performed by: INTERNAL MEDICINE

## 2022-01-18 PROCEDURE — 93000 ELECTROCARDIOGRAM COMPLETE: CPT | Performed by: INTERNAL MEDICINE

## 2022-01-18 RX ORDER — ATORVASTATIN CALCIUM 80 MG/1
TABLET, FILM COATED ORAL
Qty: 90 TABLET | Refills: 3 | Status: SHIPPED | OUTPATIENT
Start: 2022-01-18

## 2022-01-18 RX ORDER — LISINOPRIL 10 MG/1
TABLET ORAL
Qty: 180 TABLET | Refills: 3 | Status: SHIPPED | OUTPATIENT
Start: 2022-01-18

## 2022-01-18 RX ORDER — CARVEDILOL 12.5 MG/1
TABLET ORAL
Qty: 180 TABLET | Refills: 3 | Status: SHIPPED | OUTPATIENT
Start: 2022-01-18

## 2022-01-18 NOTE — PATIENT INSTRUCTIONS
Plan:  Cardiac medications reviewed including indications and pertinent side effects- refills sent   Fasting lipids and CMP    Check blood pressure and heart rate at home a few times per week- keep a log with dates and times and bring to office visit   Regular exercise and following a healthy diet encouraged   Follow up with me in November of this year.  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

## 2022-01-18 NOTE — PROGRESS NOTES
[facility]   Cardiac Followup    Referring Provider:  Hunter Carmona MD     Chief Complaint   Patient presents with    1 Year Follow Up    Coronary Artery Disease    Other     No new complaints      Magdalene Gómez   1970    History of Present Illness:   Magdalene Gómez is a 45 y.o. male who is here today for follow up for a history of coronary artery disease- non obstructive by cath 2014, nonischemic cardiomyopathy- EF 30-35% by cath, MUGA 2014 42.6%, improved to 50-55% by echocardiogram in 2/23/2021, hypertension, and hyperlipidemia. Today he states he has been feeling well since his last visit. He is tolerating his medications and taking them as prescribed. Blood pressure at home is stable. He stays active exercising daily. He has an occasional palpitations which does not bother him. Patient currently denies any weight gain, edema, chest pain, shortness of breath, dizziness, and syncope. Past Medical History:   has a past medical history of Bell's palsy and Seasonal allergies. Surgical History:   has a past surgical history that includes hernia repair; Tonsillectomy; and Sinus endoscopy (7/20/2010). Social History:   reports that he quit smoking about 3 years ago. His smoking use included cigarettes. He has a 11.00 pack-year smoking history. He has never used smokeless tobacco. He reports current alcohol use of about 4.0 standard drinks of alcohol per week. Family History:  family history includes Depression in his father. Home Medications:  Prior to Admission medications    Medication Sig Start Date End Date Taking?  Authorizing Provider   carvedilol (COREG) 12.5 MG tablet TAKE ONE TABLET BY MOUTH TWICE A DAY WITH MEALS 1/11/21  Yes Bipin Riddle MD   lisinopril (PRINIVIL;ZESTRIL) 10 MG tablet Take one tablet by mouth twice daily 1/11/21  Yes Bipin Riddle MD   atorvastatin (LIPITOR) 80 MG tablet TAKE 1 TABLET BY MOUTH ONE TIME A DAY 1/11/21  Yes Bipin Riddle MD   aspirin 81 MG tablet Take 81 mg by mouth daily. Yes Historical Provider, MD   Multiple Vitamins-Minerals (THERAPEUTIC MULTIVITAMIN-MINERALS) tablet Take 1 tablet by mouth daily. Yes Historical Provider, MD   Omega-3 Fatty Acids (FISH OIL) 1000 MG CAPS Take 1,000 mg by mouth daily. Yes Historical Provider, MD   varenicline (CHANTIX STARTING MONTH DESHAWN) 0.5 MG X 11 & 1 MG X 42 tablet Take by mouth. Patient not taking: Reported on 1/16/2020 11/17/17   Abdi Huerta MD   varenicline (CHANTIX CONTINUING MONTH PAK) 1 MG tablet Take 1 tablet by mouth 2 times daily  Patient not taking: Reported on 1/16/2020 11/17/17   Abdi Huerta MD        Allergies:  Patient has no known allergies. Review of Systems:   · Constitutional: there has been no unanticipated weight loss. There's been no change in energy level, sleep pattern, or activity level. · Eyes: No visual changes or diplopia. No scleral icterus. · ENT: No Headaches, hearing loss or vertigo. No mouth sores or sore throat. · Cardiovascular: Reviewed in HPI  · Respiratory: No cough or wheezing, no sputum production. No hematemesis. · Gastrointestinal: No abdominal pain, appetite loss, blood in stools. No change in bowel or bladder habits. · Genitourinary: No dysuria, trouble voiding, or hematuria. · Musculoskeletal:  No gait disturbance, weakness or joint complaints. · Integumentary: No rash or pruritis. · Neurological: No headache, diplopia, change in muscle strength, numbness or tingling. No change in gait, balance, coordination, mood, affect, memory, mentation, behavior. · Psychiatric: No anxiety, no depression. · Endocrine: No malaise, fatigue or temperature intolerance. No excessive thirst, fluid intake, or urination. No tremor. · Hematologic/Lymphatic: No abnormal bruising or bleeding, blood clots or swollen lymph nodes. · Allergic/Immunologic: No nasal congestion or hives.     Physical Examination:    Vitals:    01/18/22 1025 BP: 114/70   Pulse: 77   Temp: 98.3 °F (36.8 °C)   SpO2: 96%        Constitutional and General Appearance: NAD   Respiratory:  · Normal excursion and expansion without use of accessory muscles  · Resp Auscultation: Normal breath sounds without dullness  Cardiovascular:  · The apical impulses not displaced  · Heart tones are crisp and normal  · Cervical veins are not engorged  · The carotid upstroke is normal in amplitude and contour without delay or bruit  · Normal S1S2, No S3, No Murmur  · Peripheral pulses are symmetrical and full  · There is no clubbing, cyanosis of the extremities. · No edema  · Femoral Arteries: 2+ and equal  · Pedal Pulses: 2+ and equal   Abdomen:  · No masses or tenderness  · Liver/Spleen: No Abnormalities Noted  Neurological/Psychiatric:  · Alert and oriented in all spheres  · Moves all extremities well  · Exhibits normal gait balance and coordination  · No abnormalities of mood, affect, memory, mentation, or behavior are noted    Cardiac cath 3/2014  LM 20%   LAD 50% mid, FFR 0.88   Cx 20%   RCA <20%   LVEF: global hypokinesis, EF 30-35%   Nonischemic cardiomyopathy    Echo 3/2014  Global ejection fraction is moderately decreased and estimated at 40 %. Abnormal (paradoxical) septal motion is present likely due to left bundle  branch block . No significant valvular abnormalities. MUGA scan 3/4/14  Ejection fraction measures 42.6%       Echo 6/13/2014  Normal left ventricle size and wall thickness. The left ventricular systolic function is moderately reduced with an  ejection fraction of 40-45%. There is abnormal (paradoxical) septal motion consistent with left bundle  branch block. Echo 8/2015  Normal left ventricle size and wall thickness. The left ventricular systolic function is mildly reduced with an ejection  fraction of 45 %. Mild global hypokinesis. Diastolic filling parameters suggests normal diastolic function.   Systolic pulmonary artery pressure (SPAP) is normal and estimated at 23   MmHg (RA pressure 3 mmHg). Echo 11/2016   Normal left ventricle size, wall thickness and systolic function with an   estimated ejection fraction of 50-55%.   No regional wall motion abnormalities seen.   Diastolic filling parameters suggest normal diastolic filing pressure.   Systolic pulmonary artery pressure (SPAP) is normal and estimated at 24 mmHg   (RA pressure 3 mmHg). Echocardiogram 2/20/19  Summary   Lower normal LV systolic function with an estimated EF of 50-55%. EF by Emerson's method estimated at 53%. No regional wall motion abnormalities are seen. Normal left ventricular diastolic filling pressure. The right atrium is mildly dilated. Systolic pulmonary artery pressure (SPAP) is normal and estimated at 20mmHg   (right atrial pressure 3mmHg). Echocardiogram 2/23/2021  Summary   Global left ventricular systolic function is low normal with ejection   fraction estimated from 50 % to 55 %. No regional wall motion abnormalities. Normal left ventricular diastolic filling pressure. Mild bi-atrial enlargement. No significant change from exam done 2/20/2019. Assessment:   1. Nonischemic cardiomyopathy - resolved. EF returned to normal. stable   2. CAD (coronary artery disease) - nonobstructive. No anginal symptoms. stable   3. S/P cardiac catheterization     4. Chest pain - no recurrence   5. HLD - well controlled. Due for repeat   6. Former Smoker - Cont cessation discussed    DOT evaluation - asymptomatic, stable cardiac disease     Plan:  Cardiac medications reviewed including indications and pertinent side effects- refills sent   Fasting lipids and CMP    Check blood pressure and heart rate at home a few times per week- keep a log with dates and times and bring to office visit   Regular exercise and following a healthy diet encouraged   Follow up with me in November of this year.  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 Janeen Tracy M.D. By Johanna Berry RN    The scribes documentation has been prepared under my direction and personally reviewed by me in its entirety. I confirm that the note above accurately reflects all work, treatment, procedures, and medical decision making performed by me. MD Lowell Beaulieu.  Cesario Foss M.D., [facility]

## 2022-05-04 LAB
ALBUMIN SERPL-MCNC: 4.3 G/DL
ALP BLD-CCNC: 63 U/L
ALT SERPL-CCNC: 23 U/L
ANION GAP SERPL CALCULATED.3IONS-SCNC: NORMAL MMOL/L
AST SERPL-CCNC: 23 U/L
BILIRUB SERPL-MCNC: 0.3 MG/DL (ref 0.1–1.4)
BUN BLDV-MCNC: 21 MG/DL
CALCIUM SERPL-MCNC: 9.4 MG/DL
CHLORIDE BLD-SCNC: 103 MMOL/L
CHOLESTEROL, TOTAL: 196 MG/DL
CHOLESTEROL/HDL RATIO: NORMAL
CO2: 23 MMOL/L
CREAT SERPL-MCNC: 0.77 MG/DL
GFR CALCULATED: NORMAL
GLUCOSE BLD-MCNC: 95 MG/DL
HDLC SERPL-MCNC: 52 MG/DL (ref 35–70)
LDL CHOLESTEROL CALCULATED: 107 MG/DL (ref 0–160)
NONHDLC SERPL-MCNC: NORMAL MG/DL
POTASSIUM SERPL-SCNC: 4.3 MMOL/L
SODIUM BLD-SCNC: 141 MMOL/L
TOTAL PROTEIN: 6.7
TRIGL SERPL-MCNC: 213 MG/DL
VLDLC SERPL CALC-MCNC: 37 MG/DL

## 2022-05-10 ENCOUNTER — TELEPHONE (OUTPATIENT)
Dept: CARDIOLOGY CLINIC | Age: 52
End: 2022-05-10

## 2022-05-10 NOTE — TELEPHONE ENCOUNTER
----- Message from Burak Perez MD sent at 5/10/2022  1:16 PM EDT -----  Call  Chol stable  Cont current tx plan

## 2022-05-10 NOTE — TELEPHONE ENCOUNTER
Created telephone encounter. Spoke with Claude relayed message per [address] regarding labs. Pt verbalized understanding.

## 2022-07-20 LAB
ALBUMIN SERPL-MCNC: 1.7 G/DL
ALP BLD-CCNC: 58 U/L
ALT SERPL-CCNC: 19 U/L
ANION GAP SERPL CALCULATED.3IONS-SCNC: NORMAL MMOL/L
AST SERPL-CCNC: 16 U/L
BASOPHILS ABSOLUTE: NORMAL
BASOPHILS RELATIVE PERCENT: NORMAL
BILIRUB SERPL-MCNC: 0.8 MG/DL (ref 0.1–1.4)
BUN BLDV-MCNC: 23 MG/DL
CALCIUM SERPL-MCNC: 9.4 MG/DL
CHLORIDE BLD-SCNC: 106 MMOL/L
CHOLESTEROL, TOTAL: 193 MG/DL
CHOLESTEROL/HDL RATIO: 3.2
CO2: NORMAL
CREAT SERPL-MCNC: 0.78 MG/DL
EOSINOPHILS ABSOLUTE: NORMAL
EOSINOPHILS RELATIVE PERCENT: NORMAL
GFR CALCULATED: NORMAL
GLUCOSE BLD-MCNC: 96 MG/DL
HCT VFR BLD CALC: 43.1 % (ref 41–53)
HDLC SERPL-MCNC: 60 MG/DL (ref 35–70)
HEMOGLOBIN: 14.1 G/DL (ref 13.5–17.5)
IRON: 141
LDL CHOLESTEROL CALCULATED: 113 MG/DL (ref 0–160)
LYMPHOCYTES ABSOLUTE: NORMAL
LYMPHOCYTES RELATIVE PERCENT: NORMAL
MCH RBC QN AUTO: 28.8 PG
MCHC RBC AUTO-ENTMCNC: 32.7 G/DL
MCV RBC AUTO: 88.1 FL
MONOCYTES ABSOLUTE: NORMAL
MONOCYTES RELATIVE PERCENT: NORMAL
NEUTROPHILS ABSOLUTE: NORMAL
NEUTROPHILS RELATIVE PERCENT: NORMAL
NONHDLC SERPL-MCNC: NORMAL MG/DL
PLATELET # BLD: 246 K/ΜL
PMV BLD AUTO: 9.7 FL
POTASSIUM SERPL-SCNC: 4.5 MMOL/L
RBC # BLD: 4.89 10^6/ΜL
SODIUM BLD-SCNC: 141 MMOL/L
TOTAL PROTEIN: 7.1
TRIGL SERPL-MCNC: 102 MG/DL
VLDLC SERPL CALC-MCNC: NORMAL MG/DL
WBC # BLD: 4.8 10^3/ML

## 2022-07-25 ENCOUNTER — TELEPHONE (OUTPATIENT)
Dept: CARDIOLOGY CLINIC | Age: 52
End: 2022-07-25

## 2022-07-25 NOTE — TELEPHONE ENCOUNTER
----- Message from Maryjo Forde MD sent at 7/25/2022 11:10 AM EDT -----  PleasPlease notify patient that their lab results show chol has gone up  Try working more on exercise and diet - low carb, chol  Repeat lipids 6 months, if still elev, may need to add another chol med e notify patient that their lab results are normal.

## 2023-01-16 NOTE — PROGRESS NOTES
[address]   Cardiac Followup    Referring Provider:  Jevon Ewing MD     Chief Complaint   Patient presents with    Check-Up     Loss of voice    [place] CAD and NICMP     Jadieljaylan Coppola   1970    History of Present Illness:   Cheyenne Casiano is a 47 y.o. male who is here today for follow up for a history of coronary artery disease- non obstructive by cath 2014, nonischemic cardiomyopathy- EF 30-35% by cath, MUGA 2014 42.6%, improved to 50-55% by echocardiogram in 2/23/2021, hypertension, and hyperlipidemia. 1/18/22 he states he has been feeling well since his last visit. He is tolerating his medications and taking them as prescribed. Blood pressure at home is stable. He stays active exercising daily. He has an occasional palpitations which does not bother him. Patient currently denies any weight gain, edema, chest pain, shortness of breath, dizziness, and syncope. Today he reports that he has been doing good overall. He states that he loses his voice , gets a dry cough sometimes and thinks that it is related to the lisinopril. He denies chest pain, sob dizziness, syncope and edema. Past Medical History:   has a past medical history of Bell's palsy and Seasonal allergies. Surgical History:   has a past surgical history that includes hernia repair; Tonsillectomy; and Sinus endoscopy (7/20/2010). Social History:   reports that he quit smoking about 4 years ago. His smoking use included cigarettes. He has a 11.00 pack-year smoking history. He has never used smokeless tobacco. He reports current alcohol use of about 4.0 standard drinks per week. Family History:  family history includes Depression in his father. Home Medications:  Prior to Admission medications    Medication Sig Start Date End Date Taking?  Authorizing Provider   carvedilol (COREG) 12.5 MG tablet TAKE ONE TABLET BY MOUTH TWICE A DAY WITH MEALS 1/18/22   Tabitha Suresh MD   lisinopril (PRINIVIL;ZESTRIL) 10 MG tablet Take one tablet by mouth twice daily 1/18/22   Nick Singh MD   atorvastatin (LIPITOR) 80 MG tablet TAKE 1 TABLET BY MOUTH ONE TIME A DAY 1/18/22   Nick Singh MD   varenicline (CHANTIX STARTING MONTH PAK) 0.5 MG X 11 & 1 MG X 42 tablet Take by mouth. Patient not taking: Reported on 1/16/2020 11/17/17   Nick Singh MD   varenicline (CHANTIX CONTINUING MONTH PAK) 1 MG tablet Take 1 tablet by mouth 2 times daily  Patient not taking: Reported on 1/16/2020 11/17/17   Nick Singh MD   aspirin 81 MG tablet Take 81 mg by mouth daily. Historical Provider, MD   Multiple Vitamins-Minerals (THERAPEUTIC MULTIVITAMIN-MINERALS) tablet Take 1 tablet by mouth daily. Historical Provider, MD   Omega-3 Fatty Acids (FISH OIL) 1000 MG CAPS Take 1,000 mg by mouth daily. Historical Provider, MD        Allergies:  Patient has no known allergies. Review of Systems:   Constitutional: there has been no unanticipated weight loss. There's been no change in energy level, sleep pattern, or activity level. Eyes: No visual changes or diplopia. No scleral icterus. ENT: No Headaches, hearing loss or vertigo. No mouth sores or sore throat. Cardiovascular: Reviewed in HPI  Respiratory: No cough or wheezing, no sputum production. No hematemesis. Gastrointestinal: No abdominal pain, appetite loss, blood in stools. No change in bowel or bladder habits. Genitourinary: No dysuria, trouble voiding, or hematuria. Musculoskeletal:  No gait disturbance, weakness or joint complaints. Integumentary: No rash or pruritis. Neurological: No headache, diplopia, change in muscle strength, numbness or tingling. No change in gait, balance, coordination, mood, affect, memory, mentation, behavior. Psychiatric: No anxiety, no depression. Endocrine: No malaise, fatigue or temperature intolerance. No excessive thirst, fluid intake, or urination. No tremor.   Hematologic/Lymphatic: No abnormal bruising or bleeding, blood clots or swollen lymph nodes.  Allergic/Immunologic: No nasal congestion or hives.    Physical Examination:    Vitals:    01/17/23 0914   BP: 110/70   Pulse: 78   SpO2: 97%          Constitutional and General Appearance: NAD   Respiratory:  Normal excursion and expansion without use of accessory muscles  Resp Auscultation: Normal breath sounds without dullness  Cardiovascular:  The apical impulses not displaced  Heart tones are crisp and normal  Cervical veins are not engorged  The carotid upstroke is normal in amplitude and contour without delay or bruit  Normal S1S2, No S3, No Murmur  Peripheral pulses are symmetrical and full  There is no clubbing, cyanosis of the extremities.  No edema  Femoral Arteries: 2+ and equal  Pedal Pulses: 2+ and equal   Abdomen:  No masses or tenderness  Liver/Spleen: No Abnormalities Noted  Neurological/Psychiatric:  Alert and oriented in all spheres  Moves all extremities well  Exhibits normal gait balance and coordination  No abnormalities of mood, affect, memory, mentation, or behavior are noted    Cardiac cath 3/2014  LM 20%   LAD 50% mid, FFR 0.88   Cx 20%   RCA <20%   LVEF: global hypokinesis, EF 30-35%   Nonischemic cardiomyopathy    Echo 3/2014  Global ejection fraction is moderately decreased and estimated at 40 %.  Abnormal (paradoxical) septal motion is present likely due to left bundle  branch block .  No significant valvular abnormalities.    MUGA scan 3/4/14  Ejection fraction measures 42.6%       Echo 6/13/2014  Normal left ventricle size and wall thickness.  The left ventricular systolic function is moderately reduced with an  ejection fraction of 40-45%.  There is abnormal (paradoxical) septal motion consistent with left bundle  branch block.    Echo 8/2015  Normal left ventricle size and wall thickness.  The left ventricular systolic function is mildly reduced with an ejection  fraction of 45 %.  Mild global hypokinesis.  Diastolic filling parameters  suggests normal diastolic function. Systolic pulmonary artery pressure (SPAP) is normal and estimated at 23   MmHg (RA pressure 3 mmHg). Echo 2016   Normal left ventricle size, wall thickness and systolic function with an   estimated ejection fraction of 50-55%. No regional wall motion abnormalities seen. Diastolic filling parameters suggest normal diastolic filing pressure. Systolic pulmonary artery pressure (SPAP) is normal and estimated at 24 mmHg   (RA pressure 3 mmHg). Echocardiogram 19  Summary   Lower normal LV systolic function with an estimated EF of 50-55%. EF by Emerson's method estimated at 53%. No regional wall motion abnormalities are seen. Normal left ventricular diastolic filling pressure. The right atrium is mildly dilated. Systolic pulmonary artery pressure (SPAP) is normal and estimated at 20mmHg   (right atrial pressure 3mmHg). Echocardiogram 2021  Summary   Global left ventricular systolic function is low normal with ejection   fraction estimated from 50 % to 55 %. No regional wall motion abnormalities. Normal left ventricular diastolic filling pressure. Mild bi-atrial enlargement. No significant change from exam done 2019. EK22  Sinus  Rhythm  - frequent ectopic ventricular beat s   # VECs = 3  -Left bundle branch block. HR 80         Assessment:   1. Nonischemic cardiomyopathy - resolved. EF returned to normal. stable   2. CAD (coronary artery disease) - nonobstructive. No anginal symptoms. stable   5. HLD - suboptiomal. Discussed Repatha - declines  6. Former Smoker - Cont cessation discussed    Loss of voice and intermittent cough - do not suspect CE but will change.  Instructed to d/w PCP as well     Plan:  Stop lisinopril 10 mg r/t side effects  Start Valsartan 160 mg tablet daily  Start Zetia 10 mg tablet daily  Repeat Lipids CMP in 3 months  Follow a low carb diet to help aide with weight loss  Cardiac medications reviewed including indications and pertinent side effects  Check blood pressure and heart rate at home a few times per week- keep a log with dates and times and bring to office visit   Regular exercise and following a healthy diet encouraged   Follow up with me 1 year       Scribe's attestation: This note was scribed in the presence of Dr. Cass Berkowitz MD   by Melvi Thornton LPN      The scribes documentation has been prepared under my direction and personally reviewed by me in its entirety. I confirm that the note above accurately reflects all work, treatment, procedures, and medical decision making performed by me. MD Lawanda Cavazos.  Marline Martin M.D., Cristina Linder

## 2023-01-17 ENCOUNTER — OFFICE VISIT (OUTPATIENT)
Dept: CARDIOLOGY CLINIC | Age: 53
End: 2023-01-17
Payer: COMMERCIAL

## 2023-01-17 VITALS
BODY MASS INDEX: 26.85 KG/M2 | OXYGEN SATURATION: 97 % | SYSTOLIC BLOOD PRESSURE: 110 MMHG | HEART RATE: 78 BPM | WEIGHT: 198 LBS | DIASTOLIC BLOOD PRESSURE: 70 MMHG

## 2023-01-17 DIAGNOSIS — E78.2 MIXED HYPERLIPIDEMIA: Primary | ICD-10-CM

## 2023-01-17 DIAGNOSIS — I10 HYPERTENSION, UNSPECIFIED TYPE: ICD-10-CM

## 2023-01-17 PROCEDURE — 99214 OFFICE O/P EST MOD 30 MIN: CPT | Performed by: INTERNAL MEDICINE

## 2023-01-17 PROCEDURE — 3074F SYST BP LT 130 MM HG: CPT | Performed by: INTERNAL MEDICINE

## 2023-01-17 PROCEDURE — 3078F DIAST BP <80 MM HG: CPT | Performed by: INTERNAL MEDICINE

## 2023-01-17 RX ORDER — VALSARTAN 160 MG/1
160 TABLET ORAL DAILY
Qty: 30 TABLET | Refills: 5 | Status: SHIPPED | OUTPATIENT
Start: 2023-01-17

## 2023-01-17 RX ORDER — EZETIMIBE 10 MG/1
10 TABLET ORAL DAILY
Qty: 90 TABLET | Refills: 1 | Status: SHIPPED | OUTPATIENT
Start: 2023-01-17

## 2023-01-17 RX ORDER — OLMESARTAN MEDOXOMIL 5 MG/1
TABLET ORAL
Qty: 90 TABLET | Refills: 2 | Status: CANCELLED | OUTPATIENT
Start: 2023-01-17

## 2023-01-17 NOTE — PATIENT INSTRUCTIONS
Plan:  Stop lisinopril 10 mg r/t side effects  Start Valsartan 160 mg tablet daily  Start Zetia 10 mg tablet daily  Repeat Lipids CMP in 3 months  Follow a low carb diet to help aide with weight loss  Cardiac medications reviewed including indications and pertinent side effects  Check blood pressure and heart rate at home a few times per week- keep a log with dates and times and bring to office visit   Regular exercise and following a healthy diet encouraged   Follow up with me 1 year

## 2023-01-17 NOTE — LETTER
[address] [facility] [address]  Phone: [phone]  Fax: [phone]    Mauro Toure MD    January 17, 2023     Trevor Rg, [address] [place] [address]    Patient: Alexx Gómez   MR Number: [id number]   YOB: 1970   Date of Visit: 1/17/2023       Dear Trevor Rg: Thank you for referring Priti Mayberry to me for evaluation/treatment. Below are the relevant portions of my assessment and plan of care. If you have questions, please do not hesitate to call me. I look forward to following [facility] along with you.     Sincerely,      Mauro Toure MD

## 2023-02-14 DIAGNOSIS — I42.8 NONISCHEMIC CARDIOMYOPATHY (HCC): ICD-10-CM

## 2023-02-15 RX ORDER — CARVEDILOL 12.5 MG/1
TABLET ORAL
Qty: 180 TABLET | Refills: 3 | Status: SHIPPED | OUTPATIENT
Start: 2023-02-15

## 2023-02-20 RX ORDER — ATORVASTATIN CALCIUM 80 MG/1
TABLET, FILM COATED ORAL
Qty: 90 TABLET | Refills: 3 | Status: SHIPPED | OUTPATIENT
Start: 2023-02-20

## 2023-02-20 NOTE — TELEPHONE ENCOUNTER
Patient last seen 1/17/2023.         Latest Reference Range & Units 7/20/22 00:00   Chol/HDL Ratio  3.2 (E)   CHOLESTEROL, TOTAL, 264707 mg/dL 193 (E)   HDL Cholesterol 35 - 70 mg/dL 60 (E)   LDL Calculated 0 - 160 mg/dL 113 (E)   Triglycerides mg/dL 102 (E)   (E): External lab result

## 2023-05-09 RX ORDER — ATORVASTATIN CALCIUM 80 MG/1
TABLET, FILM COATED ORAL
Qty: 90 TABLET | Refills: 0 | Status: SHIPPED | OUTPATIENT
Start: 2023-05-09

## 2023-05-09 NOTE — TELEPHONE ENCOUNTER
Pt called for medication refill.   Needs atorvastatin (LIPITOR) 80 MG tablet    [facility] [id number]   [address] [facility] [address]   Phone:  [phone]  Fax:  [phone]    Please advise

## 2023-05-09 NOTE — TELEPHONE ENCOUNTER
Spoke to Pt. Employer is having wellness blood work/physical done. Will have bw results faxed to [phone]. Advised Pt that medication would not be filled until after bw results received.   Pt BHARAT

## 2023-05-09 NOTE — TELEPHONE ENCOUNTER
Last OV - 01/17/2023    Last labs - 07/2022. Patient will need to obtain ordered labs (CMP and lipids) prior to medication refill. LMOV.

## 2023-05-17 LAB
ALBUMIN SERPL-MCNC: 1.6 G/DL
ALBUMIN SERPL-MCNC: 4.3 G/DL
ALP BLD-CCNC: 54 U/L
ALP BLD-CCNC: 54 U/L
ALT SERPL-CCNC: 79 U/L
ALT SERPL-CCNC: 79 U/L
ANION GAP SERPL CALCULATED.3IONS-SCNC: ABNORMAL MMOL/L
ANION GAP SERPL CALCULATED.3IONS-SCNC: ABNORMAL MMOL/L
AST SERPL-CCNC: 62 U/L
AST SERPL-CCNC: 62 U/L
BILIRUB SERPL-MCNC: 1.4 MG/DL (ref 0.1–1.4)
BILIRUB SERPL-MCNC: 1.6 MG/DL (ref 0.1–1.4)
BUN BLDV-MCNC: 20 MG/DL
BUN BLDV-MCNC: ABNORMAL MG/DL
CALCIUM SERPL-MCNC: 9.6 MG/DL
CALCIUM SERPL-MCNC: 9.6 MG/DL
CHLORIDE BLD-SCNC: 105 MMOL/L
CHLORIDE BLD-SCNC: 105 MMOL/L
CHOLESTEROL, TOTAL: 178 MG/DL
CHOLESTEROL, TOTAL: 178 MG/DL
CHOLESTEROL/HDL RATIO: NORMAL
CHOLESTEROL/HDL RATIO: NORMAL
CO2: 29 MMOL/L
CO2: 29 MMOL/L
CREAT SERPL-MCNC: 0.7 MG/DL
CREAT SERPL-MCNC: 0.7 MG/DL
EGFR: ABNORMAL
EGFR: ABNORMAL
GLUCOSE BLD-MCNC: 103 MG/DL
GLUCOSE BLD-MCNC: 103 MG/DL
HDLC SERPL-MCNC: 69 MG/DL (ref 35–70)
HDLC SERPL-MCNC: 69 MG/DL (ref 35–70)
LDL CHOLESTEROL CALCULATED: 92 MG/DL (ref 0–160)
LDL CHOLESTEROL CALCULATED: 92 MG/DL (ref 0–160)
NONHDLC SERPL-MCNC: NORMAL MG/DL
NONHDLC SERPL-MCNC: NORMAL MG/DL
POTASSIUM SERPL-SCNC: 4.3 MMOL/L
POTASSIUM SERPL-SCNC: 4.3 MMOL/L
SODIUM BLD-SCNC: 142 MMOL/L
SODIUM BLD-SCNC: 142 MMOL/L
TOTAL PROTEIN: 7
TOTAL PROTEIN: 7
TRIGL SERPL-MCNC: 82 MG/DL
TRIGL SERPL-MCNC: 82 MG/DL
VLDLC SERPL CALC-MCNC: NORMAL MG/DL
VLDLC SERPL CALC-MCNC: NORMAL MG/DL

## 2023-05-25 DIAGNOSIS — I10 HYPERTENSION, UNSPECIFIED TYPE: ICD-10-CM

## 2023-05-25 RX ORDER — VALSARTAN 160 MG/1
160 TABLET ORAL DAILY
Qty: 90 TABLET | Refills: 1 | Status: SHIPPED | OUTPATIENT
Start: 2023-05-25

## 2023-05-25 NOTE — TELEPHONE ENCOUNTER
Refill  valsartan (DIOVAN) 160 MG tablet [  [facility] [id number] [facility] [address], [address] - F [phone]   Pt is out

## 2023-05-26 ENCOUNTER — TELEPHONE (OUTPATIENT)
Dept: CARDIOLOGY CLINIC | Age: 53
End: 2023-05-26

## 2023-05-26 NOTE — TELEPHONE ENCOUNTER
----- Message from Lisa Motta MD sent at 5/26/2023  2:38 PM EDT -----  Labs forwarded for our review, electrolytes, kidney function, lipids look good. No changes from our end. Bilirubin slightly elevated, discussed with PCP.   Thank you

## 2023-06-09 ENCOUNTER — TELEPHONE (OUTPATIENT)
Dept: CARDIOLOGY CLINIC | Age: 53
End: 2023-06-09

## 2023-06-09 NOTE — TELEPHONE ENCOUNTER
----- Message from Marium Ware MD sent at 6/9/2023  9:52 AM EDT -----  Please notify patient that their chol is stable  Cont current tx plan  Bilirubin remains mild elevated - should discuss w/ PCP.  This is not related to his heart meds

## 2023-07-11 DIAGNOSIS — E78.2 MIXED HYPERLIPIDEMIA: ICD-10-CM

## 2023-07-11 RX ORDER — EZETIMIBE 10 MG/1
10 TABLET ORAL DAILY
Qty: 90 TABLET | Refills: 1 | Status: SHIPPED | OUTPATIENT
Start: 2023-07-11

## 2023-07-11 NOTE — TELEPHONE ENCOUNTER
Pt is requesting a refill of Zeita 10mg. Preferred pharmacy is [facility] in [address] [place] [address]. Last ov 01/17/2023 [facility]. Tried to contact pt couldn't voicemail.

## 2023-08-22 DIAGNOSIS — I10 HYPERTENSION, UNSPECIFIED TYPE: ICD-10-CM

## 2023-08-22 RX ORDER — VALSARTAN 160 MG/1
160 TABLET ORAL DAILY
Qty: 90 TABLET | Refills: 2 | Status: SHIPPED | OUTPATIENT
Start: 2023-08-22

## 2023-08-22 RX ORDER — ATORVASTATIN CALCIUM 80 MG/1
TABLET, FILM COATED ORAL
Qty: 90 TABLET | Refills: 2 | Status: SHIPPED | OUTPATIENT
Start: 2023-08-22

## 2023-09-28 ENCOUNTER — TELEPHONE (OUTPATIENT)
Dept: CARDIOLOGY CLINIC | Age: 53
End: 2023-09-28

## 2023-11-02 ENCOUNTER — TELEPHONE (OUTPATIENT)
Dept: CARDIOLOGY CLINIC | Age: 53
End: 2023-11-02

## 2023-11-02 NOTE — TELEPHONE ENCOUNTER
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 They would like to know if [facility] can place the orders for the echo. Pt is seeing [facility] on 11/7 and is hoping to be scheudded for echo either on 11/7 or 11/28/2023. Please advise.

## 2023-11-02 NOTE — TELEPHONE ENCOUNTER
Should wait until we see him as may need stress test as well.  Let's make sure we get the correct test

## 2023-11-07 ENCOUNTER — OFFICE VISIT (OUTPATIENT)
Dept: CARDIOLOGY CLINIC | Age: 53
End: 2023-11-07
Payer: COMMERCIAL

## 2023-11-07 VITALS
BODY MASS INDEX: 22.62 KG/M2 | WEIGHT: 167 LBS | DIASTOLIC BLOOD PRESSURE: 70 MMHG | HEART RATE: 73 BPM | OXYGEN SATURATION: 96 % | HEIGHT: 72 IN | SYSTOLIC BLOOD PRESSURE: 116 MMHG

## 2023-11-07 DIAGNOSIS — I10 HYPERTENSION, UNSPECIFIED TYPE: ICD-10-CM

## 2023-11-07 DIAGNOSIS — I10 PRIMARY HYPERTENSION: Primary | ICD-10-CM

## 2023-11-07 DIAGNOSIS — I42.8 NONISCHEMIC CARDIOMYOPATHY (HCC): ICD-10-CM

## 2023-11-07 DIAGNOSIS — I25.10 CORONARY ARTERY DISEASE INVOLVING NATIVE CORONARY ARTERY OF NATIVE HEART WITHOUT ANGINA PECTORIS: ICD-10-CM

## 2023-11-07 DIAGNOSIS — E78.2 MIXED HYPERLIPIDEMIA: ICD-10-CM

## 2023-11-07 PROCEDURE — 99214 OFFICE O/P EST MOD 30 MIN: CPT | Performed by: INTERNAL MEDICINE

## 2023-11-07 PROCEDURE — 3078F DIAST BP <80 MM HG: CPT | Performed by: INTERNAL MEDICINE

## 2023-11-07 PROCEDURE — 3074F SYST BP LT 130 MM HG: CPT | Performed by: INTERNAL MEDICINE

## 2023-11-07 RX ORDER — CARVEDILOL 12.5 MG/1
TABLET ORAL
Qty: 180 TABLET | Refills: 3 | Status: SHIPPED | OUTPATIENT
Start: 2023-11-07

## 2023-11-07 RX ORDER — ATORVASTATIN CALCIUM 80 MG/1
TABLET, FILM COATED ORAL
Qty: 90 TABLET | Refills: 3 | Status: SHIPPED | OUTPATIENT
Start: 2023-11-07

## 2023-11-07 RX ORDER — VALSARTAN 160 MG/1
160 TABLET ORAL DAILY
Qty: 90 TABLET | Refills: 3 | Status: SHIPPED | OUTPATIENT
Start: 2023-11-07

## 2023-11-07 RX ORDER — EZETIMIBE 10 MG/1
10 TABLET ORAL DAILY
Qty: 90 TABLET | Refills: 3 | Status: SHIPPED | OUTPATIENT
Start: 2023-11-07

## 2023-11-07 NOTE — PATIENT INSTRUCTIONS
Plan:  ~Recommend a stress echocardiogram for DOT physical   Cardiac medications reviewed including indications and pertinent side effects. Medication list updated at this visit.    Check blood pressure and heart rate at home a few times per week- keep a log with dates and times and bring to office visit   Regular exercise and following a healthy diet encouraged   Follow up with me in 1 year

## 2023-11-07 NOTE — PROGRESS NOTES
[facility]   Cardiac Followup    Referring Provider:  Maria Fernanda Macias MD     Follow-up, Coronary Artery Disease, Hyperlipidemia, Cardiomyopathy, and Hypertension        Kristina Minaya   1970    History of Present Illness:   Dorlene Friday Roma Felder is a 46 y.o. male who is here today for follow up for a history of coronary artery disease- non obstructive by cath 2014, nonischemic cardiomyopathy- EF 30-35% by cath, MUGA 2014 42.6%, improved to 50-55% by echocardiogram in 2/23/2021, hypertension, and hyperlipidemia. Last visit stopped Lisinopril, started Valsartan and Zetia. 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 Today he states he has been feeling well since his last visit he si tolerating his medications and is taking them as prescribed. States he stays active with work with no limitations. Very mild PETERSEN w/ heavy exertion. Decreased w/ weight loss. Patient currently denies any weight gain, edema, palpitations, chest pain, shortness of breath, dizziness, and syncope. States he is here for DOT physical. He has lost 30 lbs over the last year on a low carb diet. Past Medical History:   has a past medical history of Bell's palsy and Seasonal allergies. Surgical History:   has a past surgical history that includes hernia repair; Tonsillectomy; and Sinus endoscopy (7/20/2010). Social History:   reports that he quit smoking about 5 years ago. His smoking use included cigarettes. He has a 11.00 pack-year smoking history. He has never used smokeless tobacco. He reports current alcohol use of about 4.0 standard drinks of alcohol per week. He reports that he does not use drugs. Family History:  family history includes Depression in his father. Home Medications:  Prior to Admission medications    Medication Sig Start Date End Date Taking?  Authorizing Provider   atorvastatin (LIPITOR) 80 MG tablet TAKE 1 TABLET BY MOUTH ONE TIME A DAY 8/22/23  Yes Jesenia Ernst MD   valsartan (DIOVAN) 160 MG tablet

## 2023-11-29 ENCOUNTER — HOSPITAL ENCOUNTER (OUTPATIENT)
Dept: CARDIOLOGY | Age: 53
Discharge: HOME | End: 2023-11-29
Payer: COMMERCIAL

## 2023-11-29 DIAGNOSIS — I10 PRIMARY HYPERTENSION: ICD-10-CM

## 2023-11-29 DIAGNOSIS — I42.8 NONISCHEMIC CARDIOMYOPATHY (HCC): ICD-10-CM

## 2023-11-29 DIAGNOSIS — I25.10 CORONARY ARTERY DISEASE INVOLVING NATIVE CORONARY ARTERY OF NATIVE HEART WITHOUT ANGINA PECTORIS: ICD-10-CM

## 2023-11-29 PROCEDURE — 93320 DOPPLER ECHO COMPLETE: CPT

## 2023-11-29 PROCEDURE — 93351 STRESS TTE COMPLETE: CPT

## 2023-11-29 NOTE — PROGRESS NOTES
Writer questioning Pt's resting EKG (LBBB) for appropriateness for stress echo / GXT. Pretest EKG with past / most recent EKG reviewed per Dr. Lucia De La Cruz to do GXT / Stess echo. Stress echocardiogram complete, Pt had SOB with exercise but denied CP. Final EKG portion of test (and VS / s/s) reviewed per Dr. Acuna Comes per Writers request. Discharge instructions give to pt. Pt verbalizes understanding to discharge instructions. Writer will give [facility] RN form per Pt's request to fax Stress test results for work clearance.

## 2023-11-30 ENCOUNTER — TELEPHONE (OUTPATIENT)
Dept: CARDIOLOGY CLINIC | Age: 53
End: 2023-11-30

## 2023-11-30 DIAGNOSIS — R94.31 ABNORMAL ECG DURING EXERCISE STRESS TEST: Primary | ICD-10-CM

## 2023-11-30 DIAGNOSIS — I44.7 LBBB (LEFT BUNDLE BRANCH BLOCK): ICD-10-CM

## 2023-11-30 RX ORDER — REGADENOSON 0.08 MG/ML
0.4 INJECTION, SOLUTION INTRAVENOUS
Status: SHIPPED | OUTPATIENT
Start: 2023-11-30

## 2023-11-30 NOTE — TELEPHONE ENCOUNTER
----- Message from Nat Bustamante MD sent at 11/30/2023  9:47 AM EST -----  Please notify patient that their stress test is equivocal due to his LBBB on the EKG  Need to do a different stress test, ie Azucena Iker  I ordered STAT due to need for DOT

## 2023-11-30 NOTE — TELEPHONE ENCOUNTER
Created telephone encounter. [facility] requesting a call back to the office.

## 2023-12-15 ENCOUNTER — HOSPITAL ENCOUNTER (OUTPATIENT)
Dept: CARDIOLOGY | Age: 53
Discharge: HOME | End: 2023-12-15
Payer: COMMERCIAL

## 2023-12-15 DIAGNOSIS — R94.31 ABNORMAL ECG DURING EXERCISE STRESS TEST: ICD-10-CM

## 2023-12-15 DIAGNOSIS — I44.7 LBBB (LEFT BUNDLE BRANCH BLOCK): ICD-10-CM

## 2023-12-15 PROCEDURE — 78452 HT MUSCLE IMAGE SPECT MULT: CPT

## 2023-12-15 PROCEDURE — A9502 TC99M TETROFOSMIN: HCPCS | Performed by: INTERNAL MEDICINE

## 2023-12-15 PROCEDURE — 93017 CV STRESS TEST TRACING ONLY: CPT

## 2023-12-15 PROCEDURE — 6360000002 HC RX W HCPCS: Performed by: INTERNAL MEDICINE

## 2023-12-15 PROCEDURE — 3430000000 HC RX DIAGNOSTIC RADIOPHARMACEUTICAL: Performed by: INTERNAL MEDICINE

## 2023-12-15 RX ORDER — REGADENOSON 0.08 MG/ML
0.4 INJECTION, SOLUTION INTRAVENOUS
Status: COMPLETED | OUTPATIENT
Start: 2023-12-15 | End: 2023-12-15

## 2023-12-15 RX ADMIN — TETROFOSMIN 11.7 MILLICURIE: 1.38 INJECTION, POWDER, LYOPHILIZED, FOR SOLUTION INTRAVENOUS at 12:56

## 2023-12-15 RX ADMIN — TETROFOSMIN 33.6 MILLICURIE: 1.38 INJECTION, POWDER, LYOPHILIZED, FOR SOLUTION INTRAVENOUS at 13:59

## 2023-12-15 RX ADMIN — REGADENOSON 0.4 MG: 0.08 INJECTION, SOLUTION INTRAVENOUS at 13:59

## 2023-12-22 ENCOUNTER — HOSPITAL ENCOUNTER (OUTPATIENT)
Dept: CARDIAC CATH/INVASIVE PROCEDURES | Age: 53
Discharge: HOME | End: 2023-12-22
Attending: INTERNAL MEDICINE | Admitting: INTERNAL MEDICINE
Payer: COMMERCIAL

## 2023-12-22 VITALS
DIASTOLIC BLOOD PRESSURE: 62 MMHG | HEIGHT: 72 IN | BODY MASS INDEX: 21.94 KG/M2 | WEIGHT: 162 LBS | HEART RATE: 63 BPM | RESPIRATION RATE: 15 BRPM | OXYGEN SATURATION: 97 % | SYSTOLIC BLOOD PRESSURE: 100 MMHG

## 2023-12-22 DIAGNOSIS — I49.3 PVC'S (PREMATURE VENTRICULAR CONTRACTIONS): Primary | ICD-10-CM

## 2023-12-22 LAB
ANION GAP SERPL CALCULATED.3IONS-SCNC: 8 MMOL/L (ref 3–16)
BUN SERPL-MCNC: 21 MG/DL (ref 7–20)
CALCIUM SERPL-MCNC: 9.2 MG/DL (ref 8.3–10.6)
CHLORIDE SERPL-SCNC: 102 MMOL/L (ref 99–110)
CHOLEST SERPL-MCNC: 134 MG/DL (ref 0–199)
CO2 SERPL-SCNC: 28 MMOL/L (ref 21–32)
CREAT SERPL-MCNC: 0.6 MG/DL (ref 0.9–1.3)
DEPRECATED RDW RBC AUTO: 14.4 % (ref 12.4–15.4)
EKG ATRIAL RATE: 64 BPM
EKG DIAGNOSIS: NORMAL
EKG P AXIS: 52 DEGREES
EKG P-R INTERVAL: 176 MS
EKG Q-T INTERVAL: 424 MS
EKG QRS DURATION: 136 MS
EKG QTC CALCULATION (BAZETT): 513 MS
EKG R AXIS: 65 DEGREES
EKG T AXIS: -17 DEGREES
EKG VENTRICULAR RATE: 88 BPM
GFR SERPLBLD CREATININE-BSD FMLA CKD-EPI: >60 ML/MIN/{1.73_M2}
GLUCOSE SERPL-MCNC: 92 MG/DL (ref 70–99)
HCT VFR BLD AUTO: 42.1 % (ref 40.5–52.5)
HDLC SERPL-MCNC: 57 MG/DL (ref 40–60)
HGB BLD-MCNC: 13.7 G/DL (ref 13.5–17.5)
INR PPP: 0.97 (ref 0.84–1.16)
LDLC SERPL CALC-MCNC: 69 MG/DL
MCH RBC QN AUTO: 29.7 PG (ref 26–34)
MCHC RBC AUTO-ENTMCNC: 32.5 G/DL (ref 31–36)
MCV RBC AUTO: 91.4 FL (ref 80–100)
PLATELET # BLD AUTO: 233 K/UL (ref 135–450)
PMV BLD AUTO: 7.1 FL (ref 5–10.5)
POTASSIUM SERPL-SCNC: 4.4 MMOL/L (ref 3.5–5.1)
PROTHROMBIN TIME: 12.9 SEC (ref 11.5–14.8)
RBC # BLD AUTO: 4.61 M/UL (ref 4.2–5.9)
SODIUM SERPL-SCNC: 138 MMOL/L (ref 136–145)
TRIGL SERPL-MCNC: 41 MG/DL (ref 0–150)
VLDLC SERPL CALC-MCNC: 8 MG/DL
WBC # BLD AUTO: 4.9 K/UL (ref 4–11)

## 2023-12-22 PROCEDURE — 85610 PROTHROMBIN TIME: CPT

## 2023-12-22 PROCEDURE — C1769 GUIDE WIRE: HCPCS | Performed by: INTERNAL MEDICINE

## 2023-12-22 PROCEDURE — 93005 ELECTROCARDIOGRAM TRACING: CPT | Performed by: INTERNAL MEDICINE

## 2023-12-22 PROCEDURE — 2500000003 HC RX 250 WO HCPCS

## 2023-12-22 PROCEDURE — C1894 INTRO/SHEATH, NON-LASER: HCPCS | Performed by: INTERNAL MEDICINE

## 2023-12-22 PROCEDURE — 2709999900 HC NON-CHARGEABLE SUPPLY: Performed by: INTERNAL MEDICINE

## 2023-12-22 PROCEDURE — 99152 MOD SED SAME PHYS/QHP 5/>YRS: CPT | Performed by: INTERNAL MEDICINE

## 2023-12-22 PROCEDURE — 93458 L HRT ARTERY/VENTRICLE ANGIO: CPT | Performed by: INTERNAL MEDICINE

## 2023-12-22 PROCEDURE — 85027 COMPLETE CBC AUTOMATED: CPT

## 2023-12-22 PROCEDURE — 6370000000 HC RX 637 (ALT 250 FOR IP)

## 2023-12-22 PROCEDURE — 93458 L HRT ARTERY/VENTRICLE ANGIO: CPT

## 2023-12-22 PROCEDURE — 93246 EXT ECG>7D<15D RECORDING: CPT | Performed by: INTERNAL MEDICINE

## 2023-12-22 PROCEDURE — 80048 BASIC METABOLIC PNL TOTAL CA: CPT

## 2023-12-22 PROCEDURE — 93010 ELECTROCARDIOGRAM REPORT: CPT | Performed by: INTERNAL MEDICINE

## 2023-12-22 PROCEDURE — 80061 LIPID PANEL: CPT

## 2023-12-22 PROCEDURE — 6360000002 HC RX W HCPCS

## 2023-12-22 RX ORDER — HEPARIN SODIUM 1000 [USP'U]/ML
INJECTION, SOLUTION INTRAVENOUS; SUBCUTANEOUS
Status: COMPLETED | OUTPATIENT
Start: 2023-12-22 | End: 2023-12-22

## 2023-12-22 RX ORDER — ASPIRIN 325 MG
325 TABLET ORAL ONCE
Status: COMPLETED | OUTPATIENT
Start: 2023-12-22 | End: 2023-12-22

## 2023-12-22 RX ORDER — LORAZEPAM 0.5 MG/1
0.5 TABLET ORAL
Status: DISCONTINUED | OUTPATIENT
Start: 2023-12-22 | End: 2023-12-22 | Stop reason: HOSPADM

## 2023-12-22 RX ORDER — SODIUM CHLORIDE 0.9 % (FLUSH) 0.9 %
5-40 SYRINGE (ML) INJECTION PRN
Status: DISCONTINUED | OUTPATIENT
Start: 2023-12-22 | End: 2023-12-22 | Stop reason: HOSPADM

## 2023-12-22 RX ORDER — SODIUM CHLORIDE 0.9 % (FLUSH) 0.9 %
5-40 SYRINGE (ML) INJECTION EVERY 12 HOURS SCHEDULED
Status: DISCONTINUED | OUTPATIENT
Start: 2023-12-22 | End: 2023-12-22 | Stop reason: HOSPADM

## 2023-12-22 RX ORDER — ACETAMINOPHEN 325 MG/1
650 TABLET ORAL EVERY 4 HOURS PRN
OUTPATIENT
Start: 2023-12-22

## 2023-12-22 RX ORDER — MIDAZOLAM HYDROCHLORIDE 1 MG/ML
INJECTION INTRAMUSCULAR; INTRAVENOUS
Status: COMPLETED | OUTPATIENT
Start: 2023-12-22 | End: 2023-12-22

## 2023-12-22 RX ORDER — SODIUM CHLORIDE 0.9 % (FLUSH) 0.9 %
5-40 SYRINGE (ML) INJECTION EVERY 12 HOURS SCHEDULED
OUTPATIENT
Start: 2023-12-22

## 2023-12-22 RX ORDER — FENTANYL CITRATE 50 UG/ML
INJECTION, SOLUTION INTRAMUSCULAR; INTRAVENOUS
Status: COMPLETED | OUTPATIENT
Start: 2023-12-22 | End: 2023-12-22

## 2023-12-22 RX ORDER — SODIUM CHLORIDE 0.9 % (FLUSH) 0.9 %
5-40 SYRINGE (ML) INJECTION PRN
OUTPATIENT
Start: 2023-12-22

## 2023-12-22 RX ORDER — SODIUM CHLORIDE 9 MG/ML
INJECTION, SOLUTION INTRAVENOUS PRN
Status: DISCONTINUED | OUTPATIENT
Start: 2023-12-22 | End: 2023-12-22 | Stop reason: HOSPADM

## 2023-12-22 RX ORDER — ONDANSETRON 2 MG/ML
4 INJECTION INTRAMUSCULAR; INTRAVENOUS EVERY 6 HOURS PRN
Status: DISCONTINUED | OUTPATIENT
Start: 2023-12-22 | End: 2023-12-22 | Stop reason: HOSPADM

## 2023-12-22 RX ORDER — SODIUM CHLORIDE 9 MG/ML
INJECTION, SOLUTION INTRAVENOUS PRN
OUTPATIENT
Start: 2023-12-22

## 2023-12-22 RX ADMIN — Medication 325 MG: at 08:15

## 2023-12-22 RX ADMIN — FENTANYL CITRATE 25 MCG: 50 INJECTION, SOLUTION INTRAMUSCULAR; INTRAVENOUS at 08:59

## 2023-12-22 RX ADMIN — HEPARIN SODIUM 4000 UNITS: 1000 INJECTION, SOLUTION INTRAVENOUS; SUBCUTANEOUS at 09:02

## 2023-12-22 RX ADMIN — FENTANYL CITRATE 25 MCG: 50 INJECTION, SOLUTION INTRAMUSCULAR; INTRAVENOUS at 08:57

## 2023-12-22 RX ADMIN — MIDAZOLAM HYDROCHLORIDE 1 MG: 1 INJECTION INTRAMUSCULAR; INTRAVENOUS at 08:57

## 2023-12-22 NOTE — PROCEDURES
CARDIAC CATHETERIZATION REPORT     Procedure Date:  2023  Patient Name: Noemi Desai  MRN: [id number] : 1970      : Nikki Munoz MD      PROCEDURES PERFORMED  Left heart cath via right radial approach  Coronary angiography  Left ventriculogram  Moderate sedation 15 min CPT 46216 (Midazolam: 1 mg, Fentanyl: 50 mcg)  Sedation start time: 0856  Sedation end time: 912  US guidance for vascular access CPT 14568      INDICATION  Cardiomyopathy, abnormal stress test    PROCEDURE DESCRIPTION  Risks/benefits/alternatives/outcomes were discussed with patient and/or family in detail and informed consent was obtained. Patient was prepped and draped in the usual sterile fashion. Versed and fentanyl were used for conscious sedation. Then local anaesthetic was applied over right radial puncture site. Using a modified Seldinger technique, the right radial artery was selectively cannulated and a 6Fr Terumo sheath was inserted into right radial artery. Verapamil and nitroglycerin were administered through the sheath. Heparin was administered. Diagnostic 6Fr JL3.5 and JR4 were used to engage left and right coronary arteries respectively and obtain angiogram. LVEDP and left ventriculogram were obtained by using the JR4 diagnostic catheter. At the conclusion of the procedure, a TR band was placed over the puncture site and hemostasis was obtained. There were no immediate complications. I supervised the sedation with fentanyl and midazolam. An independent trained observer pushed [employer] at my direction. We monitored the patient's level of consciousness and vital signs/physiologic status throughout the procedure duration. Patient tolerated the procedure well.       FINDINGS  Left main - Normal  LAD - Normal  Left circumflex - Normal  RCA - Normal    LVEDP: 5  Left ventriculogram: EF 35% with severe diffuse hypokinesis      SEDATION: Moderate conscious sedation was administered by qualified nursing

## 2023-12-22 NOTE — PROGRESS NOTES
Radial site unremarkable after TR band removed. Splint in place. VSS Rm air. IV removed. Wheeled to front entrance where wife arrived to drive him home (all belongings gathered).

## 2023-12-22 NOTE — H&P
History and physical/sedation Pre-Procedure Note    Patient Name: Jose Silvestre   YOB: 1970  Room/Bed: Cath Pool Rm/NONE  Medical Record Number: [id number]  Date: 12/22/2023   Time: 8:42 AM       Indication: Cardiomyopathy, abnormal stress test    Consent: I have discussed with the patient and/or the patient representative the indication, alternatives, and the possible risks and/or complications of the planned procedure and the anesthesia methods. The patient and/or patient representative appear to understand and agree to proceed. Vital Signs:   Vitals:    12/22/23 0821   BP: 112/62   SpO2: 98%       Past Medical History:   has a past medical history of Bell's palsy and Seasonal allergies. Past Surgical History:   has a past surgical history that includes hernia repair; Tonsillectomy; and Sinus endoscopy (7/20/2010). Medications:   Scheduled Meds:    sodium chloride flush  5-40 mL IntraVENous 2 times per day     Continuous Infusions:    sodium chloride       PRN Meds: sodium chloride flush, sodium chloride, ondansetron, LORazepam  Home Meds:   Prior to Admission medications    Medication Sig Start Date End Date Taking?  Authorizing Provider   carvedilol (COREG) 12.5 MG tablet TAKE ONE TABLET BY MOUTH TWICE A DAY WITH MEALS 11/7/23   Mandeep Stevenson MD   ezetimibe (ZETIA) 10 MG tablet Take 1 tablet by mouth daily 11/7/23   Mandeep Stevenson MD   atorvastatin (LIPITOR) 80 MG tablet TAKE 1 TABLET BY MOUTH ONE TIME A DAY 11/7/23   Mandeep Stevenson MD   valsartan (DIOVAN) 160 MG tablet Take 1 tablet by mouth daily 11/7/23   Mandeep Stevenson MD   varenicline (CHANTIX CONTINUING MONTH DESHAWN) 1 MG tablet Take 1 tablet by mouth 2 times daily  Patient not taking: Reported on 1/16/2020 11/17/17   Mandeep Stevenson MD   aspirin 81 MG tablet Take 1 tablet by mouth daily    Provider, MD Jaycee   Multiple Vitamins-Minerals (THERAPEUTIC MULTIVITAMIN-MINERALS) tablet Take 1 tablet by mouth

## 2023-12-26 ENCOUNTER — TELEPHONE (OUTPATIENT)
Dept: CARDIOLOGY CLINIC | Age: 53
End: 2023-12-26

## 2023-12-26 DIAGNOSIS — I42.8 NONISCHEMIC CARDIOMYOPATHY (HCC): Primary | ICD-10-CM

## 2023-12-26 NOTE — TELEPHONE ENCOUNTER
Called and spoke with pt, he is not sure what else needs faxed beside a letter saying its okay to drive. Letter has been created.  Pt will call back with fax number and what needs sent over

## 2023-12-26 NOTE — TELEPHONE ENCOUNTER
----- Message from Kelsey Soriano MD sent at 12/26/2023  9:59 AM EST -----  Let patient know their lipid test is normal, continue current meds, no new orders or changes at this time. Thanks.

## 2023-12-26 NOTE — TELEPHONE ENCOUNTER
Message from [address]-   I reviewed cath performed by Dr Estevez Aw   Not sure why heart fxn declined again. Need to further eval   Will proceed w/ MRI - ordered   Remain current meds   OK to continue drive for DOT   Schedule MRI to be done next few weeks   Sched OV to see me post MRI     Patient will call to schedule MRI. Will call us back with fax number and what needs sent over for DOT. He will call back once he has his MRI scheduled to make a follow up with [facility].

## 2023-12-26 NOTE — TELEPHONE ENCOUNTER
I called patient to relay results earlier and he is asking about his clearance for his DOT; he states it expires tomorrow and needs everything sent to PCP by tomorrow. Please advise?

## 2023-12-27 NOTE — TELEPHONE ENCOUNTER
Pt sts fax # [phone]. Pt sts he gave office all the forms to our office. If can not find let pt know he will have DOT fax another.  TY

## 2023-12-27 NOTE — TELEPHONE ENCOUNTER
DOT paperwork was sent back in October. Letter created and testing printed.  It has been faxed to provided number

## 2024-01-05 PROCEDURE — 93248 EXT ECG>7D<15D REV&INTERPJ: CPT | Performed by: INTERNAL MEDICINE

## 2024-02-02 ENCOUNTER — TELEPHONE (OUTPATIENT)
Dept: CARDIOLOGY CLINIC | Age: 54
End: 2024-02-02

## 2024-02-02 ENCOUNTER — HOSPITAL ENCOUNTER (OUTPATIENT)
Dept: MRI IMAGING | Age: 54
Discharge: HOME | End: 2024-02-02
Attending: INTERNAL MEDICINE
Payer: COMMERCIAL

## 2024-02-02 DIAGNOSIS — I42.8 NONISCHEMIC CARDIOMYOPATHY (HCC): ICD-10-CM

## 2024-02-02 PROCEDURE — A9585 GADOBUTROL INJECTION: HCPCS | Performed by: INTERNAL MEDICINE

## 2024-02-02 PROCEDURE — 75565 CARD MRI VELOC FLOW MAPPING: CPT

## 2024-02-02 PROCEDURE — 6360000004 HC RX CONTRAST MEDICATION: Performed by: INTERNAL MEDICINE

## 2024-02-02 RX ORDER — GADOBUTROL 604.72 MG/ML
15 INJECTION INTRAVENOUS
Status: COMPLETED | OUTPATIENT
Start: 2024-02-02 | End: 2024-02-02

## 2024-02-02 RX ADMIN — GADOBUTROL 15 ML: 604.72 INJECTION INTRAVENOUS at 10:10

## 2024-02-02 NOTE — TELEPHONE ENCOUNTER
----- Message from Torey Woodall MD sent at 2/2/2024 12:28 PM EST -----  Please notify patient that their MRI looks good  Heart fxn is only mild reduced, EF 45%. Has varied over time 45-55% and overall I believe stable. The MRI is more accurate than the echo and the cardiac cath that suggested the heart fxn was more decreased.  All stable and continue current meds and tx plan.

## 2024-02-05 DIAGNOSIS — I49.3 PVC'S (PREMATURE VENTRICULAR CONTRACTIONS): ICD-10-CM

## 2024-02-08 ENCOUNTER — TELEPHONE (OUTPATIENT)
Dept: CARDIOLOGY CLINIC | Age: 54
End: 2024-02-08

## 2024-02-08 NOTE — TELEPHONE ENCOUNTER
----- Message from Yamil Doll MD sent at 2/7/2024  3:18 PM EST -----  Please let patient know that his cardiac monitor shows frequent ectopic beats called PVCs.  While these beats are not life-threatening, they can cause damage to the heart over time and can be symptomatic with palpitations sometimes.  I would recommend EP consult at this stage.

## 2024-02-08 NOTE — TELEPHONE ENCOUNTER
Pt V/U of FXW message. He says he will F/U with [facility] at next OV regarding EP referral

## 2024-02-22 NOTE — PROGRESS NOTES
[facility]   Cardiac Followup    Referring Provider:  Unknown, Provider, DO     Follow-up, Hypertension, Coronary Artery Disease, Hyperlipidemia, and Cardiomyopathy        Claude Mills   1970    History of Present Illness:   Claude Mills is a 53 y.o. male who is here today for follow up for a history of coronary artery disease- non obstructive by cath 2014, nonischemic cardiomyopathy- EF 30-35% by cath, MUGA 2014 42.6%, improved to 50-55% by echocardiogram in 2/23/2021, hypertension, and hyperlipidemia.    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 Stress echocardiogram 11/29/2023- Nondiagnostic ekgs with LBBB and frequent PVCs, EF 40-45%. Lexiscan Myoview stress test 12/15/2023 was abnormal high risk. Left heart cath 12/22/2023 showed normal coronary arteries, normal coronaries suggesting nonischemic cause of cardiomyopathy and false positive stress test, frequent PVCs. Cardiac event monitor worn from 12/22-1/5/2024 which showed an average heart rate of 78 (), 11.74% PVC burden. Cardiac MRI 2/2/2024 showed EF at 45%.    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 He is tolerating his medications and is taking them as prescribed. Patient currently denies any weight gain, edema, palpitations, chest pain, shortness of breath, dizziness, and syncope.      Past Medical History:   has a past medical history of Bell's palsy and Seasonal allergies.    Surgical History:   has a past surgical history that includes hernia repair; Tonsillectomy; and Sinus endoscopy (7/20/2010).     Social History:   reports that he quit smoking about 5 years ago. His smoking use included cigarettes. He started smoking about 27 years ago. He has a 11.0 pack-year smoking history. He has never used smokeless tobacco. He reports current alcohol use of about 4.0 standard drinks of alcohol per week. He reports that he does not use drugs.     Family History:  family history includes Depression in his

## 2024-02-23 ENCOUNTER — OFFICE VISIT (OUTPATIENT)
Dept: CARDIOLOGY CLINIC | Age: 54
End: 2024-02-23
Payer: COMMERCIAL

## 2024-02-23 VITALS
OXYGEN SATURATION: 98 % | HEART RATE: 71 BPM | SYSTOLIC BLOOD PRESSURE: 120 MMHG | BODY MASS INDEX: 23.03 KG/M2 | WEIGHT: 170 LBS | DIASTOLIC BLOOD PRESSURE: 82 MMHG | HEIGHT: 72 IN

## 2024-02-23 DIAGNOSIS — I25.10 CORONARY ARTERY DISEASE INVOLVING NATIVE CORONARY ARTERY OF NATIVE HEART WITHOUT ANGINA PECTORIS: Primary | ICD-10-CM

## 2024-02-23 DIAGNOSIS — I42.8 NONISCHEMIC CARDIOMYOPATHY (HCC): ICD-10-CM

## 2024-02-23 DIAGNOSIS — I49.3 PVC'S (PREMATURE VENTRICULAR CONTRACTIONS): ICD-10-CM

## 2024-02-23 DIAGNOSIS — I10 PRIMARY HYPERTENSION: ICD-10-CM

## 2024-02-23 PROCEDURE — 3074F SYST BP LT 130 MM HG: CPT | Performed by: INTERNAL MEDICINE

## 2024-02-23 PROCEDURE — 99214 OFFICE O/P EST MOD 30 MIN: CPT | Performed by: INTERNAL MEDICINE

## 2024-02-23 PROCEDURE — 3079F DIAST BP 80-89 MM HG: CPT | Performed by: INTERNAL MEDICINE

## 2024-02-23 RX ORDER — CARVEDILOL 25 MG/1
TABLET ORAL
Qty: 180 TABLET | Refills: 3 | Status: SHIPPED | OUTPATIENT
Start: 2024-02-23

## 2024-02-23 NOTE — PATIENT INSTRUCTIONS
Plan:  ~Increase Coreg to 25 mg twice a day    ~You can take 2 of what you have twice a day until you get the new script which will be one pill twice a day    ~Call if you start to feel dizzy or light headed. Call if your top number of blood pressure runs less than 100, or heart rate runs less than 55  ~Referral to EP team due to PVC's and discuss an ablation   Cardiac medications reviewed including indications and pertinent side effects. Medication list updated at this visit.   Check blood pressure and heart rate at home a few times per week- keep a log with dates and times and bring to office visit   Regular exercise and following a healthy diet encouraged   Follow up with me in 6 months

## 2024-03-01 NOTE — PROGRESS NOTES
(LEXISCAN) injection 0.4 mg  0.4 mg IntraVENous ONCE PRN Torey Woodall MD         Lab Review     Lab Results   Component Value Date/Time     12/22/2023 08:10 AM    K 4.4 12/22/2023 08:10 AM     12/22/2023 08:10 AM    CO2 28 12/22/2023 08:10 AM    BUN 21 12/22/2023 08:10 AM    CREATININE 0.6 12/22/2023 08:10 AM    GLUCOSE 92 12/22/2023 08:10 AM    CALCIUM 9.2 12/22/2023 08:10 AM    LABGLOM >60 12/22/2023 08:10 AM      Lab Results   Component Value Date    WBC 4.9 12/22/2023    HGB 13.7 12/22/2023    HCT 42.1 12/22/2023    MCV 91.4 12/22/2023     12/22/2023     No results found for: \"TSH\", \"TSHREFLEX\", \"TSHFT4\", \"TSHELE\", \"LDU9ADR\", \"TSHHS\"    No results found for: \"BNP\"    I, Cirilo Maldonado RN, am scribing for and in the presence of Dr. Saravanan Stoll. 03/04/24 11:20 AM   Cirilo Maldonado RN

## 2024-03-04 ENCOUNTER — TELEPHONE (OUTPATIENT)
Dept: CARDIOLOGY CLINIC | Age: 54
End: 2024-03-04

## 2024-03-04 ENCOUNTER — OFFICE VISIT (OUTPATIENT)
Dept: CARDIOLOGY CLINIC | Age: 54
End: 2024-03-04
Payer: COMMERCIAL

## 2024-03-04 VITALS
BODY MASS INDEX: 23.7 KG/M2 | SYSTOLIC BLOOD PRESSURE: 114 MMHG | HEART RATE: 77 BPM | DIASTOLIC BLOOD PRESSURE: 72 MMHG | WEIGHT: 175 LBS | HEIGHT: 72 IN | OXYGEN SATURATION: 96 %

## 2024-03-04 DIAGNOSIS — I49.3 PVC'S (PREMATURE VENTRICULAR CONTRACTIONS): Primary | ICD-10-CM

## 2024-03-04 DIAGNOSIS — I42.8 NONISCHEMIC CARDIOMYOPATHY (HCC): ICD-10-CM

## 2024-03-04 DIAGNOSIS — I44.7 LBBB (LEFT BUNDLE BRANCH BLOCK): ICD-10-CM

## 2024-03-04 DIAGNOSIS — R00.2 PALPITATIONS: ICD-10-CM

## 2024-03-04 PROCEDURE — 3078F DIAST BP <80 MM HG: CPT | Performed by: INTERNAL MEDICINE

## 2024-03-04 PROCEDURE — 3074F SYST BP LT 130 MM HG: CPT | Performed by: INTERNAL MEDICINE

## 2024-03-04 PROCEDURE — 93000 ELECTROCARDIOGRAM COMPLETE: CPT | Performed by: INTERNAL MEDICINE

## 2024-03-04 PROCEDURE — 99205 OFFICE O/P NEW HI 60 MIN: CPT | Performed by: INTERNAL MEDICINE

## 2024-03-04 ASSESSMENT — ENCOUNTER SYMPTOMS
HEMATEMESIS: 0
WHEEZING: 0
LEFT EYE: 0
SHORTNESS OF BREATH: 0
STRIDOR: 0
RIGHT EYE: 0
HEMATOCHEZIA: 0

## 2024-03-04 NOTE — TELEPHONE ENCOUNTER
[facility]  EP Procedure Sheet    03/04/2024  Claude Mills  1970  [id number]  EP Procedures  [] Pacemaker implant (single/dual) [x] EP Study   [] ICD implant (single/dual) [] Atrial flutter ablation (KAMRAN Y/N)   [] Biv implant ICD [] Tilt Table   [] Biv implant PPM [] Atrial fibrillation ablation (KAMRAN Yes)   [] Generator Change (PPM/ICD/BiV) [] SVT ablation   [] Lead revision (RV/LA/RA) (<1 month) [x] PVC ablation     [] Lead extraction +/- upgrade (BiV/PPM/ICD) [] VT Ischemic/ non-ischemic   [] Loop implant/ removal [] VT RVOT   [] Cardioversion [] VT Left sided   [] KAMRAN [] AVN ablation   Equipment  [] Medtronic  [x] RUFUS Mapping System    [] Precision [x] Ensite X   [] [place] [] Carto Mapping System   [] [employer] [] CryoAblation   [] Biotronik [] Laser Lead Extraction   EP Procedures Scheduling Request  # hours Requested  []1 []2 []2-4 [x] 4-6 Scheduled  Date:   Specific Day  Completed    Anesthesia [x]yes []no F/u Date:   CT surgery backup []yes [x]no Location []FF[]AND   Overnight stay   []KW[]David Milner MD []RMM []MXA []MW  []UL []CMV  []WW [] RWH   [x]KA [] JMB [] AJK  First vs repeat   []1st [] 2nd [] 3rd   Pre-Procedure Labs / Imaging  [] PT/INR [] Type & cross   [x] CBC [] Units PRBC   [x] BMP/Mg [] Units FFP   [] Venogram [] Cardiac CTA for Pulmonary vein mapping     RN INITIALS: RNJC    Patient Instructions  Dx:PVC  ICD-10 code: I49.3  Medication Instructions: Hold []Xarelto []Eliquis []Coumadin []pradaxa for   Do not eat or drink after midnight the night prior to procedure [x] Yes [] No    Patient would like to schedule in May.   - Hold Carvedilol three days prior to the procedure  - Hold multivitamin and fish oil the morning of the procedure

## 2024-03-04 NOTE — PATIENT INSTRUCTIONS
Plan:     Discussed the risks and benefits of a PVC ablation (literature provided).   - Hold Carvedilol three days prior to the procedure  - Hold multivitamin and fish oil the morning of the procedure   Decrease alcohol consumption with the goal of cessation.   Continue taking current cardiac medication as prescribed.   Follow up with me after the procedure.

## 2024-03-15 NOTE — TELEPHONE ENCOUNTER
Procedure:  EPS/PVC Ablation  Doctor:  Dr. Stoll  Date:  5/21/24  Time:  8am  Arrival:  6:30am  Reps:  [place] X  Anesthesia:  Yes      Spoke with patient. Please have patient arrive to the main entrance of [facility] ([address] [place] [id number]) and check in with the registration desk.  They will be directed to the Cath Lab.  Remind patient to be NPO after midnight (8 hours prior). Do not apply lotions/creams on skin the day of procedure.    [facility]

## 2024-05-20 ENCOUNTER — ANESTHESIA EVENT (OUTPATIENT)
Dept: CARDIAC CATH/INVASIVE PROCEDURES | Age: 54
End: 2024-05-20
Payer: COMMERCIAL

## 2024-05-21 ENCOUNTER — TELEPHONE (OUTPATIENT)
Dept: CARDIAC CATH/INVASIVE PROCEDURES | Age: 54
End: 2024-05-21

## 2024-05-21 ENCOUNTER — HOSPITAL ENCOUNTER (OUTPATIENT)
Age: 54
Discharge: HOME | End: 2024-05-21
Attending: INTERNAL MEDICINE | Admitting: INTERNAL MEDICINE
Payer: COMMERCIAL

## 2024-05-21 ENCOUNTER — ANESTHESIA (OUTPATIENT)
Dept: CARDIAC CATH/INVASIVE PROCEDURES | Age: 54
End: 2024-05-21
Payer: COMMERCIAL

## 2024-05-21 ENCOUNTER — ANCILLARY PROCEDURE (OUTPATIENT)
Dept: CARDIOLOGY CLINIC | Age: 54
End: 2024-05-21
Payer: COMMERCIAL

## 2024-05-21 VITALS
BODY MASS INDEX: 24.08 KG/M2 | HEIGHT: 71 IN | DIASTOLIC BLOOD PRESSURE: 84 MMHG | WEIGHT: 172 LBS | SYSTOLIC BLOOD PRESSURE: 136 MMHG | HEART RATE: 53 BPM | OXYGEN SATURATION: 99 %

## 2024-05-21 DIAGNOSIS — I42.9 CARDIOMYOPATHY, UNSPECIFIED TYPE (HCC): ICD-10-CM

## 2024-05-21 DIAGNOSIS — I42.8 NONISCHEMIC CARDIOMYOPATHY (HCC): ICD-10-CM

## 2024-05-21 DIAGNOSIS — I49.3 PVC'S (PREMATURE VENTRICULAR CONTRACTIONS): Primary | ICD-10-CM

## 2024-05-21 DIAGNOSIS — I49.3 PVC'S (PREMATURE VENTRICULAR CONTRACTIONS): ICD-10-CM

## 2024-05-21 DIAGNOSIS — I49.3 VENTRICULAR PREMATURE BEATS: ICD-10-CM

## 2024-05-21 LAB
ANION GAP SERPL CALCULATED.3IONS-SCNC: 10 MMOL/L (ref 3–16)
BUN SERPL-MCNC: 21 MG/DL (ref 7–20)
CALCIUM SERPL-MCNC: 8.9 MG/DL (ref 8.3–10.6)
CHLORIDE SERPL-SCNC: 104 MMOL/L (ref 99–110)
CO2 SERPL-SCNC: 28 MMOL/L (ref 21–32)
CREAT SERPL-MCNC: 0.7 MG/DL (ref 0.9–1.3)
DEPRECATED RDW RBC AUTO: 14.8 % (ref 12.4–15.4)
EKG ATRIAL RATE: 45 BPM
EKG DIAGNOSIS: NORMAL
EKG P AXIS: 49 DEGREES
EKG P-R INTERVAL: 166 MS
EKG Q-T INTERVAL: 512 MS
EKG QRS DURATION: 96 MS
EKG QTC CALCULATION (BAZETT): 442 MS
EKG R AXIS: 57 DEGREES
EKG T AXIS: 72 DEGREES
EKG VENTRICULAR RATE: 45 BPM
GFR SERPLBLD CREATININE-BSD FMLA CKD-EPI: >90 ML/MIN/{1.73_M2}
GLUCOSE SERPL-MCNC: 94 MG/DL (ref 70–99)
HCT VFR BLD AUTO: 39.3 % (ref 40.5–52.5)
HGB BLD-MCNC: 12.8 G/DL (ref 13.5–17.5)
MCH RBC QN AUTO: 29.3 PG (ref 26–34)
MCHC RBC AUTO-ENTMCNC: 32.5 G/DL (ref 31–36)
MCV RBC AUTO: 90.3 FL (ref 80–100)
PLATELET # BLD AUTO: 210 K/UL (ref 135–450)
PMV BLD AUTO: 7.9 FL (ref 5–10.5)
POTASSIUM SERPL-SCNC: 4 MMOL/L (ref 3.5–5.1)
RBC # BLD AUTO: 4.36 M/UL (ref 4.2–5.9)
SODIUM SERPL-SCNC: 142 MMOL/L (ref 136–145)
WBC # BLD AUTO: 6.2 K/UL (ref 4–11)

## 2024-05-21 PROCEDURE — 6360000002 HC RX W HCPCS

## 2024-05-21 PROCEDURE — 85027 COMPLETE CBC AUTOMATED: CPT

## 2024-05-21 PROCEDURE — 2500000003 HC RX 250 WO HCPCS

## 2024-05-21 PROCEDURE — 2500000003 HC RX 250 WO HCPCS: Performed by: INTERNAL MEDICINE

## 2024-05-21 PROCEDURE — 2580000003 HC RX 258: Performed by: INTERNAL MEDICINE

## 2024-05-21 PROCEDURE — 93010 ELECTROCARDIOGRAM REPORT: CPT | Performed by: INTERNAL MEDICINE

## 2024-05-21 PROCEDURE — 93270 REMOTE 30 DAY ECG REV/REPORT: CPT | Performed by: INTERNAL MEDICINE

## 2024-05-21 PROCEDURE — 80048 BASIC METABOLIC PNL TOTAL CA: CPT

## 2024-05-21 PROCEDURE — 93005 ELECTROCARDIOGRAM TRACING: CPT | Performed by: INTERNAL MEDICINE

## 2024-05-21 RX ORDER — CARVEDILOL 12.5 MG/1
TABLET ORAL
Qty: 90 TABLET | Refills: 1 | Status: SHIPPED | OUTPATIENT
Start: 2024-05-21

## 2024-05-21 RX ORDER — ONDANSETRON 2 MG/ML
4 INJECTION INTRAMUSCULAR; INTRAVENOUS
Status: CANCELLED | OUTPATIENT
Start: 2024-05-21 | End: 2024-05-22

## 2024-05-21 RX ORDER — SODIUM CHLORIDE 9 MG/ML
INJECTION, SOLUTION INTRAVENOUS PRN
Status: DISCONTINUED | OUTPATIENT
Start: 2024-05-21 | End: 2024-05-21 | Stop reason: HOSPADM

## 2024-05-21 RX ORDER — SODIUM CHLORIDE 0.9 % (FLUSH) 0.9 %
5-40 SYRINGE (ML) INJECTION EVERY 12 HOURS SCHEDULED
Status: DISCONTINUED | OUTPATIENT
Start: 2024-05-21 | End: 2024-05-21 | Stop reason: HOSPADM

## 2024-05-21 RX ORDER — SODIUM CHLORIDE 0.9 % (FLUSH) 0.9 %
5-40 SYRINGE (ML) INJECTION PRN
Status: DISCONTINUED | OUTPATIENT
Start: 2024-05-21 | End: 2024-05-21 | Stop reason: HOSPADM

## 2024-05-21 RX ORDER — LABETALOL HYDROCHLORIDE 5 MG/ML
10 INJECTION, SOLUTION INTRAVENOUS
Status: CANCELLED | OUTPATIENT
Start: 2024-05-21

## 2024-05-21 RX ORDER — MEPERIDINE HYDROCHLORIDE 50 MG/ML
12.5 INJECTION INTRAMUSCULAR; INTRAVENOUS; SUBCUTANEOUS EVERY 5 MIN PRN
Status: CANCELLED | OUTPATIENT
Start: 2024-05-21

## 2024-05-21 RX ORDER — DIPHENHYDRAMINE HYDROCHLORIDE 50 MG/ML
12.5 INJECTION INTRAMUSCULAR; INTRAVENOUS
Status: CANCELLED | OUTPATIENT
Start: 2024-05-21 | End: 2024-05-22

## 2024-05-21 RX ORDER — OXYCODONE HYDROCHLORIDE 5 MG/1
5 TABLET ORAL
Status: CANCELLED | OUTPATIENT
Start: 2024-05-21 | End: 2024-05-22

## 2024-05-21 RX ORDER — ONDANSETRON 2 MG/ML
4 INJECTION INTRAMUSCULAR; INTRAVENOUS EVERY 6 HOURS PRN
Status: DISCONTINUED | OUTPATIENT
Start: 2024-05-21 | End: 2024-05-21 | Stop reason: HOSPADM

## 2024-05-21 RX ORDER — SODIUM CHLORIDE 9 MG/ML
INJECTION, SOLUTION INTRAVENOUS PRN
Status: CANCELLED | OUTPATIENT
Start: 2024-05-21

## 2024-05-21 RX ORDER — LORAZEPAM 0.5 MG/1
0.5 TABLET ORAL
Status: DISCONTINUED | OUTPATIENT
Start: 2024-05-21 | End: 2024-05-21 | Stop reason: HOSPADM

## 2024-05-21 RX ORDER — NALOXONE HYDROCHLORIDE 0.4 MG/ML
INJECTION, SOLUTION INTRAMUSCULAR; INTRAVENOUS; SUBCUTANEOUS PRN
Status: CANCELLED | OUTPATIENT
Start: 2024-05-21

## 2024-05-21 RX ORDER — SODIUM CHLORIDE 0.9 % (FLUSH) 0.9 %
5-40 SYRINGE (ML) INJECTION PRN
Status: CANCELLED | OUTPATIENT
Start: 2024-05-21

## 2024-05-21 RX ORDER — SODIUM CHLORIDE 0.9 % (FLUSH) 0.9 %
5-40 SYRINGE (ML) INJECTION EVERY 12 HOURS SCHEDULED
Status: CANCELLED | OUTPATIENT
Start: 2024-05-21

## 2024-05-21 RX ADMIN — ISOPROTERENOL HYDROCHLORIDE 1 MCG/MIN: 0.2 INJECTION, SOLUTION INTRAMUSCULAR; INTRAVENOUS at 08:13

## 2024-05-21 NOTE — TELEPHONE ENCOUNTER
Patient being discharge today.  Procedure cancelled.  1 week monitor placed and limited echo ordered.  Dr. Stoll asks to see him in 6 weeks. Can you please check his schedule and let patient know.  Thank you

## 2024-05-21 NOTE — ANESTHESIA PRE PROCEDURE
Department of Anesthesiology  Preprocedure Note       Name:  Claude Mills   Age:  53 y.o.  :  1970                                          MRN:  [id number]         Date:  2024      Surgeon: Surgeon(s):  Saravanan Stoll MD    Procedure: Procedure(s):  Ablation PVC    Medications prior to admission:   Prior to Admission medications    Medication Sig Start Date End Date Taking? Authorizing Provider   carvedilol (COREG) 25 MG tablet TAKE ONE TABLET BY MOUTH TWICE A DAY WITH MEALS 24   Torey Woodall MD   ezetimibe (ZETIA) 10 MG tablet Take 1 tablet by mouth daily 23   Torey Woodall MD   atorvastatin (LIPITOR) 80 MG tablet TAKE 1 TABLET BY MOUTH ONE TIME A DAY 23   Torey Woodall MD   valsartan (DIOVAN) 160 MG tablet Take 1 tablet by mouth daily 23   Torey Woodall MD   aspirin 81 MG tablet Take 1 tablet by mouth daily    ProviderJaycee MD   Multiple Vitamins-Minerals (THERAPEUTIC MULTIVITAMIN-MINERALS) tablet Take 1 tablet by mouth daily    Jaycee Poe MD   Omega-3 Fatty Acids (FISH OIL) 1000 MG CAPS Take 1 capsule by mouth daily    ProviderJaycee MD       Current medications:    Current Facility-Administered Medications   Medication Dose Route Frequency Provider Last Rate Last Admin   • sodium chloride flush 0.9 % injection 5-40 mL  5-40 mL IntraVENous 2 times per day Saravanan Stoll MD       • sodium chloride flush 0.9 % injection 5-40 mL  5-40 mL IntraVENous PRN Saravanan Stoll MD       • 0.9 % sodium chloride infusion   IntraVENous PRN Saravanan Stoll MD       • LORazepam (ATIVAN) tablet 0.5 mg  0.5 mg Oral Once PRN Saravanan Stoll MD       • ondansetron (ZOFRAN) injection 4 mg  4 mg IntraVENous Q6H PRN Saravanan Stoll MD           Allergies:  No Known Allergies    Problem List:    Patient Active Problem List   Diagnosis Code   • Bell's palsy G51.0   • ETD (eustachian tube dysfunction) H69.90   • Seasonal allergies J30.2   • Nasal

## 2024-05-21 NOTE — FLOWSHEET NOTE
Patient/family given instructions by Dr Stoll for follow up. Pt ambulatory to vehicle to be discharged home with family.

## 2024-05-21 NOTE — TELEPHONE ENCOUNTER
Monitor company [employer]  Length of monitor 7  Monitor ordered by  Saravanan Stoll MD   Patch ID [id number]  Device number [id number]  Activation successful prior to pt leaving hospital? Yes  Instructions given to patient and his wife.  Both verbalized understanding.  All questions answered to the best of my ability.

## 2024-05-21 NOTE — FLOWSHEET NOTE
Heart monitor placed per Janet MONTES.  Janet discussed with pt, office will call in regards to follow up appointments.  Pt and family verbalize understanding.  No questions or c/o voiced.  Wife remains with pt.

## 2024-05-21 NOTE — TELEPHONE ENCOUNTER
05/21 attempted to call pt @   [phone] (Mobile)   No answer and vmb was full, unable to lvm. Will try again at later time

## 2024-06-04 DIAGNOSIS — I10 HYPERTENSION, UNSPECIFIED TYPE: ICD-10-CM

## 2024-06-04 RX ORDER — VALSARTAN 160 MG/1
160 TABLET ORAL DAILY
Qty: 90 TABLET | Refills: 3 | Status: SHIPPED | OUTPATIENT
Start: 2024-06-04

## 2024-06-04 NOTE — TELEPHONE ENCOUNTER
Last OV 02/23/2024  Upcoming OV 08/30/2024  BMP 05/2024   Equal and normal pulses (carotid, femoral, dorsalis pedis)

## 2024-06-10 ENCOUNTER — TELEPHONE (OUTPATIENT)
Dept: CARDIOLOGY CLINIC | Age: 54
End: 2024-06-10

## 2024-06-10 NOTE — TELEPHONE ENCOUNTER
----- Message from Saravanan Stoll MD sent at 6/7/2024  7:15 PM EDT -----  Regarding: FW:  Please let patient know that his echo shows similar results to before (heart function mildly decreased). I believe there is an ongoing event monitor which will help us determine next steps in management.  ----- Message -----  From: Torey Woodall MD  Sent: 6/7/2024  11:24 AM EDT  To: Saravanan Stoll MD

## 2024-06-14 LAB — ECHO BSA: 1.98 M2

## 2024-06-14 PROCEDURE — 93272 ECG/REVIEW INTERPRET ONLY: CPT | Performed by: INTERNAL MEDICINE

## 2024-06-17 ENCOUNTER — TELEPHONE (OUTPATIENT)
Dept: CARDIOLOGY CLINIC | Age: 54
End: 2024-06-17

## 2024-06-17 NOTE — TELEPHONE ENCOUNTER
----- Message from Saravanan Stoll MD sent at 6/17/2024  1:37 PM EDT -----  Regarding: event monitor result  Please let patient know that his event monitor showed a significant number of PVC's (even more than before). Best to discuss in person. If he is available next Monday, we can try to fit him at 10:45 AM during clinic.       ----- Message -----  From: Hudson Raymond MD  Sent: 6/14/2024   5:15 PM EDT  To: Saravanan Stoll MD

## 2024-06-17 NOTE — TELEPHONE ENCOUNTER
I spoke with the patient and he will take the appointment at 10:45 am on 06/24/2024. Please book appointment. Patient does not need to be called.

## 2024-06-20 ENCOUNTER — TELEPHONE (OUTPATIENT)
Dept: CARDIOLOGY CLINIC | Age: 54
End: 2024-06-20

## 2024-06-20 NOTE — TELEPHONE ENCOUNTER
6/20- called pt @[phone]. Unable to lvm. Vm box is full. I attempted to reach the other patient as well and unfortunately was unable to make contact. Lvm for other pt and created an encounter.

## 2024-06-20 NOTE — TELEPHONE ENCOUNTER
----- Message from Saravanan Stoll MD sent at 6/20/2024  6:05 AM EDT -----  Regarding: Scheduling conflict   I apologize as I meant June 26 at 10:45 for this patient (Wednesday) and not Monday June 24. Now we ended up with two patients scheduled at same time on Monday and challenging Monday schedule. Can we see if he’s ok moving to Wednesday at same time? That will give us more time to have a discussion regarding next step in treatment. Or if other patient scheduled at 10:45 can move to Wednesday then that’s also ok. Thank you.

## 2024-06-21 NOTE — TELEPHONE ENCOUNTER
2nd attempt: No answer vm full at [phone]. called pt @[phone]. Scheduling conflict on kxa's 6/24/24 clinic date. Two pts scheduled at 1045a, one of these pts need to be rescheduled to 6/26/24 at 1045a per kxa.

## 2024-06-21 NOTE — TELEPHONE ENCOUNTER
Sent pt an updated [facility] msg stating we can still see him on 6/24/24 at 1045am with dom

## 2024-06-21 NOTE — PROGRESS NOTES
injury, injury to cardiac structures, and the very infrequent risks of stroke or danger to life). The patient had an opportunity to ask questions and was satisfied with explanations. Decided to proceed with the option of ablation. The procedure will be scheduled again in the near future. Hold beta-blocker 7 days prior to procedure.    No significant evidence of volume overload.    Post ablation, we would re-assess LV function and also PVC burden.     2. Good overall blood pressure control    Plan:     Discussed the risks and benefits of a PVC ablation (literature provided).   - Hold Carvedilol one week prior to the procedure  - Hold multivitamin and fish oil the morning of the procedure   Decrease alcohol consumption with the goal of cessation.   Continue taking current cardiac medication as prescribed.   Follow up with me in 4 months.      Subjective:     Patient ID: Claude Mills is a 53 y.o. male.    Chief Complaint:  Chief Complaint   Patient presents with    Follow-up    Other     PVSc      HPI    Patient is a pleasant 53 y.o. male who presents for evaluation of nonischemic cardiomyopathy. The patient has a past medical history of nonischemic cardiomyopathy, frequent PVC's, coronary artery disease, hypertension, and hyperlipidemia. He underwent LHC in 2014 that revealed nonobstructive CAD and en EF of 30 - 35%. Echocardiogram in 02/2021 showed an EF of 50 - 55%. Stress echocardiogram 11/29/2023- Nondiagnostic ekgs with LBBB and frequent PVCs, EF 40-45%. Lexiscan Myoview stress test 12/15/2023 was abnormal high risk. Left heart cath 12/22/2023 showed normal coronary arteries, normal coronaries suggesting nonischemic cause of cardiomyopathy and false positive stress test, frequent PVCs. Cardiac event monitor worn from 12/22-1/5/2024 which showed an average heart rate of 78 (), 11.74% PVC burden. Cardiac MRI 2/2/2024 showed EF at 45%.     Office Visit (EP Clinic, 03/04/2024):  He presents to the clinic today

## 2024-06-24 ENCOUNTER — TELEPHONE (OUTPATIENT)
Dept: CARDIOLOGY CLINIC | Age: 54
End: 2024-06-24

## 2024-06-24 ENCOUNTER — OFFICE VISIT (OUTPATIENT)
Dept: CARDIOLOGY CLINIC | Age: 54
End: 2024-06-24
Payer: COMMERCIAL

## 2024-06-24 VITALS
HEIGHT: 71 IN | SYSTOLIC BLOOD PRESSURE: 106 MMHG | WEIGHT: 177.8 LBS | DIASTOLIC BLOOD PRESSURE: 72 MMHG | HEART RATE: 80 BPM | BODY MASS INDEX: 24.89 KG/M2 | OXYGEN SATURATION: 97 %

## 2024-06-24 DIAGNOSIS — R00.1 BRADYCARDIA: ICD-10-CM

## 2024-06-24 DIAGNOSIS — I49.3 PVC'S (PREMATURE VENTRICULAR CONTRACTIONS): ICD-10-CM

## 2024-06-24 DIAGNOSIS — I50.22 CHRONIC SYSTOLIC HEART FAILURE (HCC): ICD-10-CM

## 2024-06-24 DIAGNOSIS — I49.3 PVC (PREMATURE VENTRICULAR CONTRACTION): ICD-10-CM

## 2024-06-24 DIAGNOSIS — I42.8 NONISCHEMIC CARDIOMYOPATHY (HCC): ICD-10-CM

## 2024-06-24 DIAGNOSIS — I49.3 PVC'S (PREMATURE VENTRICULAR CONTRACTIONS): Primary | ICD-10-CM

## 2024-06-24 DIAGNOSIS — I49.3 FREQUENT PVCS: Primary | ICD-10-CM

## 2024-06-24 DIAGNOSIS — Z79.899 ENCOUNTER FOR MONITORING BETA BLOCKER THERAPY: ICD-10-CM

## 2024-06-24 DIAGNOSIS — Z51.81 ENCOUNTER FOR MONITORING BETA BLOCKER THERAPY: ICD-10-CM

## 2024-06-24 PROCEDURE — 93000 ELECTROCARDIOGRAM COMPLETE: CPT | Performed by: INTERNAL MEDICINE

## 2024-06-24 PROCEDURE — 99214 OFFICE O/P EST MOD 30 MIN: CPT | Performed by: INTERNAL MEDICINE

## 2024-06-24 PROCEDURE — 3078F DIAST BP <80 MM HG: CPT | Performed by: INTERNAL MEDICINE

## 2024-06-24 PROCEDURE — 3074F SYST BP LT 130 MM HG: CPT | Performed by: INTERNAL MEDICINE

## 2024-06-24 NOTE — PATIENT INSTRUCTIONS
Plan:     Discussed the risks and benefits of a PVC ablation (literature provided).   - Hold Carvedilol one week prior to the procedure  - Hold multivitamin and fish oil the morning of the procedure   Decrease alcohol consumption with the goal of cessation.   Continue taking current cardiac medication as prescribed.   Follow up with me in 4 months.

## 2024-06-24 NOTE — TELEPHONE ENCOUNTER
Case request placed. Per KXA please schedule the patient on a Thursday as the first case of the day. Thank you.     - Hold Carvedilol one week prior to the procedure  - Hold multivitamin and fish oil the morning of the procedure

## 2024-07-15 PROBLEM — I49.3 PVC (PREMATURE VENTRICULAR CONTRACTION): Status: ACTIVE | Noted: 2024-06-24

## 2024-07-15 NOTE — TELEPHONE ENCOUNTER
Procedure:  PVC Ablation  Doctor:  Dr. Stoll  Date:  9/12/24 (pt request)  Time:  8am  Arrival:  6:30am  Reps:  Ensite X  Anesthesia:  Yes    Please advise if 4 hours is sufficient. Thank you!      Spoke with patient's wife. Please have patient arrive to the main entrance of [facility] ([address] [place], [place] [id number]) and check in with the registration desk.  They will be directed to the Cath Lab.  Remind patient to be NPO after midnight (8 hours prior). Do not apply lotions/creams on skin the day of procedure.

## 2024-07-19 RX ORDER — ATORVASTATIN CALCIUM 80 MG/1
TABLET, FILM COATED ORAL
Qty: 90 TABLET | Refills: 1 | Status: SHIPPED | OUTPATIENT
Start: 2024-07-19

## 2024-07-19 NOTE — TELEPHONE ENCOUNTER
Pt is requesting refill of Lipitor 80mg. Preferred pharmacy is    [facility] [id number] - [facility] [address] - P [phone]     Last ov 02/23/2024 [facility]  Next ov 08/30/2024 mgh

## 2024-09-12 ENCOUNTER — ANESTHESIA (OUTPATIENT)
Dept: CARDIAC CATH/INVASIVE PROCEDURES | Age: 54
End: 2024-09-12
Payer: COMMERCIAL

## 2024-09-12 ENCOUNTER — HOSPITAL ENCOUNTER (INPATIENT)
Age: 54
LOS: 1 days | Discharge: HOME | DRG: 273 | End: 2024-09-13
Attending: INTERNAL MEDICINE | Admitting: INTERNAL MEDICINE
Payer: COMMERCIAL

## 2024-09-12 ENCOUNTER — ANESTHESIA EVENT (OUTPATIENT)
Dept: CARDIAC CATH/INVASIVE PROCEDURES | Age: 54
End: 2024-09-12
Payer: COMMERCIAL

## 2024-09-12 ENCOUNTER — TELEPHONE (OUTPATIENT)
Dept: CARDIOLOGY CLINIC | Age: 54
End: 2024-09-12

## 2024-09-12 DIAGNOSIS — I49.3 PVC (PREMATURE VENTRICULAR CONTRACTION): ICD-10-CM

## 2024-09-12 PROBLEM — I49.9 ARRHYTHMIA: Status: ACTIVE | Noted: 2024-09-12

## 2024-09-12 PROBLEM — I77.72 ILIAC ARTERY DISSECTION (HCC): Status: ACTIVE | Noted: 2024-09-12

## 2024-09-12 LAB
ABO + RH BLD: NORMAL
ANION GAP SERPL CALCULATED.3IONS-SCNC: 12 MMOL/L (ref 3–16)
BLD GP AB SCN SERPL QL: NORMAL
BUN SERPL-MCNC: 19 MG/DL (ref 7–20)
CALCIUM SERPL-MCNC: 9.3 MG/DL (ref 8.3–10.6)
CHLORIDE SERPL-SCNC: 105 MMOL/L (ref 99–110)
CO2 SERPL-SCNC: 27 MMOL/L (ref 21–32)
CREAT SERPL-MCNC: 0.8 MG/DL (ref 0.9–1.3)
DEPRECATED RDW RBC AUTO: 14.9 % (ref 12.4–15.4)
ECHO BSA: 1.97 M2
ECHO BSA: 1.97 M2
EKG ATRIAL RATE: 49 BPM
EKG ATRIAL RATE: 69 BPM
EKG DIAGNOSIS: NORMAL
EKG DIAGNOSIS: NORMAL
EKG P AXIS: 44 DEGREES
EKG P AXIS: 64 DEGREES
EKG P-R INTERVAL: 164 MS
EKG P-R INTERVAL: 166 MS
EKG Q-T INTERVAL: 462 MS
EKG Q-T INTERVAL: 526 MS
EKG QRS DURATION: 142 MS
EKG QRS DURATION: 96 MS
EKG QTC CALCULATION (BAZETT): 475 MS
EKG QTC CALCULATION (BAZETT): 495 MS
EKG R AXIS: 60 DEGREES
EKG R AXIS: 67 DEGREES
EKG T AXIS: -42 DEGREES
EKG T AXIS: 85 DEGREES
EKG VENTRICULAR RATE: 49 BPM
EKG VENTRICULAR RATE: 69 BPM
GFR SERPLBLD CREATININE-BSD FMLA CKD-EPI: >90 ML/MIN/{1.73_M2}
GLUCOSE SERPL-MCNC: 91 MG/DL (ref 70–99)
HCT VFR BLD AUTO: 41.1 % (ref 40.5–52.5)
HGB BLD-MCNC: 13.7 G/DL (ref 13.5–17.5)
MAGNESIUM SERPL-MCNC: 2.2 MG/DL (ref 1.8–2.4)
MCH RBC QN AUTO: 30.5 PG (ref 26–34)
MCHC RBC AUTO-ENTMCNC: 33.2 G/DL (ref 31–36)
MCV RBC AUTO: 91.9 FL (ref 80–100)
PLATELET # BLD AUTO: 239 K/UL (ref 135–450)
PMV BLD AUTO: 7.4 FL (ref 5–10.5)
POTASSIUM SERPL-SCNC: 3.4 MMOL/L (ref 3.5–5.1)
RBC # BLD AUTO: 4.47 M/UL (ref 4.2–5.9)
SODIUM SERPL-SCNC: 144 MMOL/L (ref 136–145)
WBC # BLD AUTO: 6.1 K/UL (ref 4–11)

## 2024-09-12 PROCEDURE — 93010 ELECTROCARDIOGRAM REPORT: CPT | Performed by: INTERNAL MEDICINE

## 2024-09-12 PROCEDURE — 6360000002 HC RX W HCPCS: Performed by: NURSE ANESTHETIST, CERTIFIED REGISTERED

## 2024-09-12 PROCEDURE — 2500000003 HC RX 250 WO HCPCS: Performed by: INTERNAL MEDICINE

## 2024-09-12 PROCEDURE — 93005 ELECTROCARDIOGRAM TRACING: CPT | Performed by: INTERNAL MEDICINE

## 2024-09-12 PROCEDURE — 36140 INTRO NDL ICATH UPR/LXTR ART: CPT | Performed by: INTERNAL MEDICINE

## 2024-09-12 PROCEDURE — C1760 CLOSURE DEV, VASC: HCPCS | Performed by: INTERNAL MEDICINE

## 2024-09-12 PROCEDURE — 02K83ZZ MAP CONDUCTION MECHANISM, PERCUTANEOUS APPROACH: ICD-10-PCS | Performed by: INTERNAL MEDICINE

## 2024-09-12 PROCEDURE — 86850 RBC ANTIBODY SCREEN: CPT

## 2024-09-12 PROCEDURE — 2720000010 HC SURG SUPPLY STERILE: Performed by: INTERNAL MEDICINE

## 2024-09-12 PROCEDURE — B41F1ZZ FLUOROSCOPY OF RIGHT LOWER EXTREMITY ARTERIES USING LOW OSMOLAR CONTRAST: ICD-10-PCS | Performed by: INTERNAL MEDICINE

## 2024-09-12 PROCEDURE — C1769 GUIDE WIRE: HCPCS | Performed by: INTERNAL MEDICINE

## 2024-09-12 PROCEDURE — 75710 ARTERY X-RAYS ARM/LEG: CPT | Performed by: SURGERY

## 2024-09-12 PROCEDURE — 86901 BLOOD TYPING SEROLOGIC RH(D): CPT

## 2024-09-12 PROCEDURE — C2630 CATH, EP, COOL-TIP: HCPCS | Performed by: INTERNAL MEDICINE

## 2024-09-12 PROCEDURE — 6360000002 HC RX W HCPCS: Performed by: INTERNAL MEDICINE

## 2024-09-12 PROCEDURE — 2709999900 HC NON-CHARGEABLE SUPPLY: Performed by: INTERNAL MEDICINE

## 2024-09-12 PROCEDURE — 2580000003 HC RX 258: Performed by: NURSE ANESTHETIST, CERTIFIED REGISTERED

## 2024-09-12 PROCEDURE — 3700000000 HC ANESTHESIA ATTENDED CARE: Performed by: INTERNAL MEDICINE

## 2024-09-12 PROCEDURE — 80048 BASIC METABOLIC PNL TOTAL CA: CPT

## 2024-09-12 PROCEDURE — 36246 INS CATH ABD/L-EXT ART 2ND: CPT | Performed by: SURGERY

## 2024-09-12 PROCEDURE — 75716 ARTERY X-RAYS ARMS/LEGS: CPT | Performed by: INTERNAL MEDICINE

## 2024-09-12 PROCEDURE — 7100000011 HC PHASE II RECOVERY - ADDTL 15 MIN: Performed by: INTERNAL MEDICINE

## 2024-09-12 PROCEDURE — 7100000000 HC PACU RECOVERY - FIRST 15 MIN: Performed by: INTERNAL MEDICINE

## 2024-09-12 PROCEDURE — C1887 CATHETER, GUIDING: HCPCS | Performed by: INTERNAL MEDICINE

## 2024-09-12 PROCEDURE — 7100000010 HC PHASE II RECOVERY - FIRST 15 MIN: Performed by: INTERNAL MEDICINE

## 2024-09-12 PROCEDURE — 83735 ASSAY OF MAGNESIUM: CPT

## 2024-09-12 PROCEDURE — 4A0234Z MEASUREMENT OF CARDIAC ELECTRICAL ACTIVITY, PERCUTANEOUS APPROACH: ICD-10-PCS | Performed by: INTERNAL MEDICINE

## 2024-09-12 PROCEDURE — 85347 COAGULATION TIME ACTIVATED: CPT

## 2024-09-12 PROCEDURE — 4A023FZ MEASUREMENT OF CARDIAC RHYTHM, PERCUTANEOUS APPROACH: ICD-10-PCS | Performed by: INTERNAL MEDICINE

## 2024-09-12 PROCEDURE — 93623 PRGRMD STIMJ&PACG IV RX NFS: CPT | Performed by: INTERNAL MEDICINE

## 2024-09-12 PROCEDURE — 2580000003 HC RX 258: Performed by: INTERNAL MEDICINE

## 2024-09-12 PROCEDURE — 85027 COMPLETE CBC AUTOMATED: CPT

## 2024-09-12 PROCEDURE — 36200 PLACE CATHETER IN AORTA: CPT | Performed by: INTERNAL MEDICINE

## 2024-09-12 PROCEDURE — C1894 INTRO/SHEATH, NON-LASER: HCPCS | Performed by: INTERNAL MEDICINE

## 2024-09-12 PROCEDURE — 02583ZZ DESTRUCTION OF CONDUCTION MECHANISM, PERCUTANEOUS APPROACH: ICD-10-PCS | Performed by: INTERNAL MEDICINE

## 2024-09-12 PROCEDURE — 3700000001 HC ADD 15 MINUTES (ANESTHESIA): Performed by: INTERNAL MEDICINE

## 2024-09-12 PROCEDURE — 36140 INTRO NDL ICATH UPR/LXTR ART: CPT | Performed by: SURGERY

## 2024-09-12 PROCEDURE — 75716 ARTERY X-RAYS ARMS/LEGS: CPT | Performed by: SURGERY

## 2024-09-12 PROCEDURE — 7100000001 HC PACU RECOVERY - ADDTL 15 MIN: Performed by: INTERNAL MEDICINE

## 2024-09-12 PROCEDURE — 93654 COMPRE EP EVAL TX VT: CPT | Performed by: INTERNAL MEDICINE

## 2024-09-12 PROCEDURE — 86900 BLOOD TYPING SEROLOGIC ABO: CPT

## 2024-09-12 PROCEDURE — 6360000004 HC RX CONTRAST MEDICATION: Performed by: INTERNAL MEDICINE

## 2024-09-12 PROCEDURE — 6370000000 HC RX 637 (ALT 250 FOR IP): Performed by: INTERNAL MEDICINE

## 2024-09-12 PROCEDURE — C1730 CATH, EP, 19 OR FEW ELECT: HCPCS | Performed by: INTERNAL MEDICINE

## 2024-09-12 PROCEDURE — 2060000000 HC ICU INTERMEDIATE R&B

## 2024-09-12 PROCEDURE — 36200 PLACE CATHETER IN AORTA: CPT | Performed by: SURGERY

## 2024-09-12 RX ORDER — BUPIVACAINE HYDROCHLORIDE 5 MG/ML
INJECTION, SOLUTION EPIDURAL; INTRACAUDAL PRN
Status: DISCONTINUED | OUTPATIENT
Start: 2024-09-12 | End: 2024-09-12 | Stop reason: HOSPADM

## 2024-09-12 RX ORDER — MEPERIDINE HYDROCHLORIDE 50 MG/ML
12.5 INJECTION INTRAMUSCULAR; INTRAVENOUS; SUBCUTANEOUS EVERY 5 MIN PRN
Status: DISCONTINUED | OUTPATIENT
Start: 2024-09-12 | End: 2024-09-12 | Stop reason: HOSPADM

## 2024-09-12 RX ORDER — SODIUM CHLORIDE 0.9 % (FLUSH) 0.9 %
5-40 SYRINGE (ML) INJECTION EVERY 12 HOURS SCHEDULED
Status: DISCONTINUED | OUTPATIENT
Start: 2024-09-12 | End: 2024-09-12 | Stop reason: HOSPADM

## 2024-09-12 RX ORDER — CARVEDILOL 6.25 MG/1
12.5 TABLET ORAL 2 TIMES DAILY WITH MEALS
Status: DISCONTINUED | OUTPATIENT
Start: 2024-09-12 | End: 2024-09-13 | Stop reason: HOSPADM

## 2024-09-12 RX ORDER — EZETIMIBE 10 MG/1
10 TABLET ORAL DAILY
Status: DISCONTINUED | OUTPATIENT
Start: 2024-09-13 | End: 2024-09-13 | Stop reason: HOSPADM

## 2024-09-12 RX ORDER — VALSARTAN 80 MG/1
160 TABLET ORAL DAILY
Status: DISCONTINUED | OUTPATIENT
Start: 2024-09-13 | End: 2024-09-13 | Stop reason: HOSPADM

## 2024-09-12 RX ORDER — ONDANSETRON 2 MG/ML
4 INJECTION INTRAMUSCULAR; INTRAVENOUS EVERY 6 HOURS PRN
Status: DISCONTINUED | OUTPATIENT
Start: 2024-09-12 | End: 2024-09-12 | Stop reason: HOSPADM

## 2024-09-12 RX ORDER — PROPOFOL 10 MG/ML
INJECTION, EMULSION INTRAVENOUS
Status: DISCONTINUED | OUTPATIENT
Start: 2024-09-12 | End: 2024-09-12 | Stop reason: SDUPTHER

## 2024-09-12 RX ORDER — SODIUM CHLORIDE, SODIUM LACTATE, POTASSIUM CHLORIDE, CALCIUM CHLORIDE 600; 310; 30; 20 MG/100ML; MG/100ML; MG/100ML; MG/100ML
INJECTION, SOLUTION INTRAVENOUS
Status: DISCONTINUED | OUTPATIENT
Start: 2024-09-12 | End: 2024-09-12 | Stop reason: SDUPTHER

## 2024-09-12 RX ORDER — OXYCODONE HYDROCHLORIDE 5 MG/1
5 TABLET ORAL
Status: DISCONTINUED | OUTPATIENT
Start: 2024-09-12 | End: 2024-09-12 | Stop reason: HOSPADM

## 2024-09-12 RX ORDER — SODIUM CHLORIDE 9 MG/ML
INJECTION, SOLUTION INTRAVENOUS PRN
Status: DISCONTINUED | OUTPATIENT
Start: 2024-09-12 | End: 2024-09-12 | Stop reason: HOSPADM

## 2024-09-12 RX ORDER — DIPHENHYDRAMINE HYDROCHLORIDE 50 MG/ML
12.5 INJECTION INTRAMUSCULAR; INTRAVENOUS
Status: DISCONTINUED | OUTPATIENT
Start: 2024-09-12 | End: 2024-09-12 | Stop reason: HOSPADM

## 2024-09-12 RX ORDER — ONDANSETRON 2 MG/ML
4 INJECTION INTRAMUSCULAR; INTRAVENOUS
Status: DISCONTINUED | OUTPATIENT
Start: 2024-09-12 | End: 2024-09-12 | Stop reason: HOSPADM

## 2024-09-12 RX ORDER — ASPIRIN 81 MG/1
81 TABLET ORAL DAILY
Status: DISCONTINUED | OUTPATIENT
Start: 2024-09-12 | End: 2024-09-13 | Stop reason: HOSPADM

## 2024-09-12 RX ORDER — LABETALOL HYDROCHLORIDE 5 MG/ML
10 INJECTION, SOLUTION INTRAVENOUS
Status: DISCONTINUED | OUTPATIENT
Start: 2024-09-12 | End: 2024-09-12 | Stop reason: HOSPADM

## 2024-09-12 RX ORDER — SODIUM CHLORIDE 0.9 % (FLUSH) 0.9 %
5-40 SYRINGE (ML) INJECTION EVERY 12 HOURS SCHEDULED
Status: DISCONTINUED | OUTPATIENT
Start: 2024-09-12 | End: 2024-09-13 | Stop reason: HOSPADM

## 2024-09-12 RX ORDER — PROCHLORPERAZINE EDISYLATE 5 MG/ML
5 INJECTION INTRAMUSCULAR; INTRAVENOUS
Status: DISCONTINUED | OUTPATIENT
Start: 2024-09-12 | End: 2024-09-12 | Stop reason: HOSPADM

## 2024-09-12 RX ORDER — ISOPROTERENOL HYDROCHLORIDE 0.2 MG/ML
INJECTION, SOLUTION INTRAVENOUS PRN
Status: DISCONTINUED | OUTPATIENT
Start: 2024-09-12 | End: 2024-09-12 | Stop reason: HOSPADM

## 2024-09-12 RX ORDER — SODIUM CHLORIDE 0.9 % (FLUSH) 0.9 %
5-40 SYRINGE (ML) INJECTION PRN
Status: DISCONTINUED | OUTPATIENT
Start: 2024-09-12 | End: 2024-09-12 | Stop reason: HOSPADM

## 2024-09-12 RX ORDER — IOPAMIDOL 755 MG/ML
INJECTION, SOLUTION INTRAVASCULAR PRN
Status: DISCONTINUED | OUTPATIENT
Start: 2024-09-12 | End: 2024-09-12 | Stop reason: HOSPADM

## 2024-09-12 RX ORDER — ATORVASTATIN CALCIUM 80 MG/1
80 TABLET, FILM COATED ORAL NIGHTLY
Status: DISCONTINUED | OUTPATIENT
Start: 2024-09-12 | End: 2024-09-13 | Stop reason: HOSPADM

## 2024-09-12 RX ORDER — SODIUM CHLORIDE 9 MG/ML
INJECTION, SOLUTION INTRAVENOUS PRN
Status: DISCONTINUED | OUTPATIENT
Start: 2024-09-12 | End: 2024-09-13 | Stop reason: HOSPADM

## 2024-09-12 RX ORDER — NALOXONE HYDROCHLORIDE 0.4 MG/ML
INJECTION, SOLUTION INTRAMUSCULAR; INTRAVENOUS; SUBCUTANEOUS PRN
Status: DISCONTINUED | OUTPATIENT
Start: 2024-09-12 | End: 2024-09-12 | Stop reason: HOSPADM

## 2024-09-12 RX ORDER — SODIUM CHLORIDE 0.9 % (FLUSH) 0.9 %
5-40 SYRINGE (ML) INJECTION PRN
Status: DISCONTINUED | OUTPATIENT
Start: 2024-09-12 | End: 2024-09-13 | Stop reason: HOSPADM

## 2024-09-12 RX ORDER — LORAZEPAM 0.5 MG/1
0.5 TABLET ORAL
Status: DISCONTINUED | OUTPATIENT
Start: 2024-09-12 | End: 2024-09-12 | Stop reason: HOSPADM

## 2024-09-12 RX ORDER — PROTAMINE SULFATE 10 MG/ML
INJECTION, SOLUTION INTRAVENOUS
Status: DISCONTINUED | OUTPATIENT
Start: 2024-09-12 | End: 2024-09-12 | Stop reason: SDUPTHER

## 2024-09-12 RX ORDER — HEPARIN SODIUM 1000 [USP'U]/ML
INJECTION, SOLUTION INTRAVENOUS; SUBCUTANEOUS PRN
Status: DISCONTINUED | OUTPATIENT
Start: 2024-09-12 | End: 2024-09-12 | Stop reason: HOSPADM

## 2024-09-12 RX ORDER — LORAZEPAM 2 MG/ML
0.5 INJECTION INTRAMUSCULAR
Status: DISCONTINUED | OUTPATIENT
Start: 2024-09-12 | End: 2024-09-12 | Stop reason: HOSPADM

## 2024-09-12 RX ADMIN — PROPOFOL 25 MCG/KG/MIN: 10 INJECTION, EMULSION INTRAVENOUS at 09:46

## 2024-09-12 RX ADMIN — PROTAMINE SULFATE 35 MG: 10 INJECTION, SOLUTION INTRAVENOUS at 11:16

## 2024-09-12 RX ADMIN — Medication 10 ML: at 20:03

## 2024-09-12 RX ADMIN — ASPIRIN 81 MG: 81 TABLET, COATED ORAL at 17:56

## 2024-09-12 RX ADMIN — PROTAMINE SULFATE 5 MG: 10 INJECTION, SOLUTION INTRAVENOUS at 11:12

## 2024-09-12 RX ADMIN — SODIUM CHLORIDE, SODIUM LACTATE, POTASSIUM CHLORIDE, AND CALCIUM CHLORIDE: .6; .31; .03; .02 INJECTION, SOLUTION INTRAVENOUS at 08:27

## 2024-09-12 RX ADMIN — ATORVASTATIN CALCIUM 80 MG: 80 TABLET, FILM COATED ORAL at 20:03

## 2024-09-12 RX ADMIN — PROPOFOL 25 MG: 10 INJECTION, EMULSION INTRAVENOUS at 09:46

## 2024-09-12 ASSESSMENT — ENCOUNTER SYMPTOMS: SHORTNESS OF BREATH: 0

## 2024-09-13 VITALS
OXYGEN SATURATION: 94 % | HEART RATE: 67 BPM | DIASTOLIC BLOOD PRESSURE: 66 MMHG | TEMPERATURE: 97.7 F | BODY MASS INDEX: 23.05 KG/M2 | WEIGHT: 170.2 LBS | RESPIRATION RATE: 17 BRPM | SYSTOLIC BLOOD PRESSURE: 103 MMHG | HEIGHT: 72 IN

## 2024-09-13 LAB
ANION GAP SERPL CALCULATED.3IONS-SCNC: 8 MMOL/L (ref 3–16)
BUN SERPL-MCNC: 14 MG/DL (ref 7–20)
CALCIUM SERPL-MCNC: 8.3 MG/DL (ref 8.3–10.6)
CHLORIDE SERPL-SCNC: 107 MMOL/L (ref 99–110)
CO2 SERPL-SCNC: 24 MMOL/L (ref 21–32)
CREAT SERPL-MCNC: 0.6 MG/DL (ref 0.9–1.3)
DEPRECATED RDW RBC AUTO: 15.4 % (ref 12.4–15.4)
EKG ATRIAL RATE: 59 BPM
EKG DIAGNOSIS: NORMAL
EKG P AXIS: 37 DEGREES
EKG P-R INTERVAL: 166 MS
EKG Q-T INTERVAL: 468 MS
EKG QRS DURATION: 88 MS
EKG QTC CALCULATION (BAZETT): 463 MS
EKG R AXIS: 27 DEGREES
EKG T AXIS: 43 DEGREES
EKG VENTRICULAR RATE: 59 BPM
GFR SERPLBLD CREATININE-BSD FMLA CKD-EPI: >90 ML/MIN/{1.73_M2}
GLUCOSE SERPL-MCNC: 95 MG/DL (ref 70–99)
HCT VFR BLD AUTO: 37.8 % (ref 40.5–52.5)
HGB BLD-MCNC: 12.1 G/DL (ref 13.5–17.5)
MCH RBC QN AUTO: 29.6 PG (ref 26–34)
MCHC RBC AUTO-ENTMCNC: 32.1 G/DL (ref 31–36)
MCV RBC AUTO: 92.1 FL (ref 80–100)
PLATELET # BLD AUTO: 185 K/UL (ref 135–450)
PMV BLD AUTO: 7.9 FL (ref 5–10.5)
POTASSIUM SERPL-SCNC: 3.9 MMOL/L (ref 3.5–5.1)
RBC # BLD AUTO: 4.11 M/UL (ref 4.2–5.9)
SODIUM SERPL-SCNC: 139 MMOL/L (ref 136–145)
WBC # BLD AUTO: 9 K/UL (ref 4–11)

## 2024-09-13 PROCEDURE — 93010 ELECTROCARDIOGRAM REPORT: CPT | Performed by: INTERNAL MEDICINE

## 2024-09-13 PROCEDURE — 6370000000 HC RX 637 (ALT 250 FOR IP): Performed by: INTERNAL MEDICINE

## 2024-09-13 PROCEDURE — 85027 COMPLETE CBC AUTOMATED: CPT

## 2024-09-13 PROCEDURE — 99233 SBSQ HOSP IP/OBS HIGH 50: CPT | Performed by: NURSE PRACTITIONER

## 2024-09-13 PROCEDURE — 93005 ELECTROCARDIOGRAM TRACING: CPT | Performed by: INTERNAL MEDICINE

## 2024-09-13 PROCEDURE — 2580000003 HC RX 258: Performed by: INTERNAL MEDICINE

## 2024-09-13 PROCEDURE — 36415 COLL VENOUS BLD VENIPUNCTURE: CPT

## 2024-09-13 PROCEDURE — 80048 BASIC METABOLIC PNL TOTAL CA: CPT

## 2024-09-13 RX ADMIN — VALSARTAN 160 MG: 80 TABLET, FILM COATED ORAL at 09:22

## 2024-09-13 RX ADMIN — CARVEDILOL 12.5 MG: 6.25 TABLET, FILM COATED ORAL at 09:22

## 2024-09-13 RX ADMIN — Medication 10 ML: at 09:24

## 2024-09-13 RX ADMIN — EZETIMIBE 10 MG: 10 TABLET ORAL at 09:27

## 2024-09-13 RX ADMIN — ASPIRIN 81 MG: 81 TABLET, COATED ORAL at 09:23

## 2024-09-17 ENCOUNTER — TELEPHONE (OUTPATIENT)
Dept: CARDIOLOGY CLINIC | Age: 54
End: 2024-09-17

## 2024-09-17 DIAGNOSIS — I49.3 VENTRICULAR ECTOPIC BEATS: Primary | ICD-10-CM

## 2024-09-17 NOTE — TELEPHONE ENCOUNTER
Please cancel appointments in October and December. Schedule lab visit at the beginning/mid October for one week ANGIE, order placed. Schedule at 3:45pm on 11/13/2024 with JAMILA.

## 2024-09-17 NOTE — TELEPHONE ENCOUNTER
----- Message from Dr. Saravanan Stoll MD sent at 9/13/2024  4:51 PM EDT -----  Regarding: RE:  Let’s move it up to early November and arrange one week event monitor prior to that visit. Thank you  ----- Message -----  From: Cirilo Georges RN  Sent: 9/13/2024   4:05 PM EDT  To: Saravanan Stoll MD    Is it okay to leave this appointment as scheduled on 12/09 or would you like it moved up sooner?  ----- Message -----  From: Rere Carmona  Sent: 9/13/2024   2:56 PM EDT  To: Cirilo Georges RN; LOLI Vazquez CNP; #     received a message from SIMON Nieto on 9/12/24 stating to schedule a 3mo followup with KXA s/p ablation. The pt is scheduled for 12/9/24. Should we change this for a sooner date?    Thank you,  Rere  ----- Message -----  From: Shannon Trujillo APRN - CNP  Sent: 9/13/2024   1:26 PM EDT  To: Cirilo Georges RN; #    Hi,     Dr. Stoll would like to see this patient in 4-6 weeks. Please arrange. Thanks.     Shannon

## 2024-09-19 LAB
POC ACT LR: 122 SEC
POC ACT LR: 135 SEC
POC ACT LR: 167 SEC
POC ACT LR: 234 SEC
POC ACT LR: 261 SEC
POC ACT LR: 273 SEC
POC ACT LR: 282 SEC
POC ACT LR: 314 SEC

## 2024-10-09 ENCOUNTER — TELEPHONE (OUTPATIENT)
Dept: VASCULAR SURGERY | Age: 54
End: 2024-10-09

## 2024-10-09 DIAGNOSIS — I77.72 ILIAC ARTERY DISSECTION (HCC): Primary | ICD-10-CM

## 2024-10-09 NOTE — TELEPHONE ENCOUNTER
Called patient to reschedule his office apt and schedule his cta abdomin/pelvis. This cta is scheduled for 10/18/2024 at [facility] at 10:30am and arrive at 10:00am and office visit 10/25/2024 at 9:15am. Npo 4 hrs prior to CTA. Robbie

## 2024-10-17 ENCOUNTER — TELEPHONE (OUTPATIENT)
Dept: CARDIOLOGY CLINIC | Age: 54
End: 2024-10-17

## 2024-10-17 NOTE — TELEPHONE ENCOUNTER
LVM at [phone] informing we no longer have anymore monitors at this time to place today. Informed in vm that our office will call him back to arrange the placement.

## 2024-10-18 ENCOUNTER — ANCILLARY PROCEDURE (OUTPATIENT)
Dept: CARDIOLOGY CLINIC | Age: 54
End: 2024-10-18
Payer: COMMERCIAL

## 2024-10-18 ENCOUNTER — TELEPHONE (OUTPATIENT)
Dept: CARDIOLOGY CLINIC | Age: 54
End: 2024-10-18

## 2024-10-18 ENCOUNTER — HOSPITAL ENCOUNTER (OUTPATIENT)
Dept: CT IMAGING | Age: 54
Discharge: HOME | End: 2024-10-18
Attending: SURGERY
Payer: COMMERCIAL

## 2024-10-18 DIAGNOSIS — I49.3 VENTRICULAR ECTOPIC BEATS: ICD-10-CM

## 2024-10-18 DIAGNOSIS — I77.72 ILIAC ARTERY DISSECTION (HCC): ICD-10-CM

## 2024-10-18 PROCEDURE — 6360000004 HC RX CONTRAST MEDICATION: Performed by: SURGERY

## 2024-10-18 PROCEDURE — 93242 EXT ECG>48HR<7D RECORDING: CPT | Performed by: INTERNAL MEDICINE

## 2024-10-18 PROCEDURE — 74174 CTA ABD&PLVS W/CONTRAST: CPT

## 2024-10-18 RX ORDER — IOPAMIDOL 755 MG/ML
75 INJECTION, SOLUTION INTRAVASCULAR
Status: COMPLETED | OUTPATIENT
Start: 2024-10-18 | End: 2024-10-18

## 2024-10-18 RX ADMIN — IOPAMIDOL 75 ML: 755 INJECTION, SOLUTION INTRAVENOUS at 11:07

## 2024-10-18 NOTE — TELEPHONE ENCOUNTER
I attempted to call the patient to reschedule monitor placement as we do not have [employer] monitors at this time. Please reschedule to next week or offer to mail to patient home. LMOV for return call.

## 2024-10-18 NOTE — TELEPHONE ENCOUNTER
Cirlio Georges, RN routed conversation to [facility] ; Jacoby Hernandez Ep32 minutes ago (7:57 AM)     Cirilo Georges, RN32 minutes ago (7:57 AM)     DEEJAY  I attempted to call the patient to reschedule monitor placement as we do not have [employer] monitors at this time. Please reschedule to next week or offer to mail to patient home. LMOV for return call.

## 2024-10-25 ENCOUNTER — OFFICE VISIT (OUTPATIENT)
Dept: VASCULAR SURGERY | Age: 54
End: 2024-10-25
Payer: COMMERCIAL

## 2024-10-25 VITALS
WEIGHT: 170 LBS | BODY MASS INDEX: 23.03 KG/M2 | HEIGHT: 72 IN | SYSTOLIC BLOOD PRESSURE: 108 MMHG | DIASTOLIC BLOOD PRESSURE: 70 MMHG

## 2024-10-25 DIAGNOSIS — I77.72 ILIAC ARTERY DISSECTION (HCC): Primary | ICD-10-CM

## 2024-10-25 PROCEDURE — 3078F DIAST BP <80 MM HG: CPT | Performed by: SURGERY

## 2024-10-25 PROCEDURE — 3074F SYST BP LT 130 MM HG: CPT | Performed by: SURGERY

## 2024-10-25 PROCEDURE — 99214 OFFICE O/P EST MOD 30 MIN: CPT | Performed by: SURGERY

## 2024-10-25 NOTE — PROGRESS NOTES
Outpatient Follow Up Note    Claude Mills is 54 y.o. 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  F/U CTA done last week.  Denies leg pain, pelvic pain, or claudication.      Past Medical History:   Diagnosis Date    Bell's palsy     Seasonal allergies 2012       Past Surgical History:   Procedure Laterality Date    CARDIAC PROCEDURE Bilateral 2024    Peripheral angiography performed by Saravanan Stoll MD at [facility] CARDIAC CATH LAB    EP DEVICE PROCEDURE N/A 2024    Ablation PVC performed by Saravanan Stoll MD at [facility] CARDIAC CATH LAB    EP DEVICE PROCEDURE Right 2024    Venogram performed by Saravanan Stoll MD at [facility] CARDIAC CATH LAB    HERNIA REPAIR      SINUS ENDOSCOPY  2010    TONSILLECTOMY       Social History:    Social History     Tobacco Use   Smoking Status Former    Current packs/day: 0.00    Average packs/day: 0.5 packs/day for 22.0 years (11.0 ttl pk-yrs)    Types: Cigarettes    Start date: 1996    Quit date: 2018    Years since quittin.2   Smokeless Tobacco Never     Current Medications:  Current Outpatient Medications   Medication Sig Dispense Refill    atorvastatin (LIPITOR) 80 MG tablet TAKE 1 TABLET BY MOUTH ONE TIME A DAY 90 tablet 1    valsartan (DIOVAN) 160 MG tablet TAKE 1 TABLET BY MOUTH DAILY 90 tablet 3    carvedilol (COREG) 12.5 MG tablet TAKE ONE TABLET BY MOUTH TWICE A DAY WITH MEALS 90 tablet 1    ezetimibe (ZETIA) 10 MG tablet Take 1 tablet by mouth daily 90 tablet 3    aspirin 81 MG tablet Take 1 tablet by mouth daily      Multiple Vitamins-Minerals (THERAPEUTIC MULTIVITAMIN-MINERALS) tablet Take 1 tablet by mouth daily      Omega-3 Fatty Acids (FISH OIL)

## 2024-11-12 LAB — ECHO BSA: 1.98 M2

## 2024-11-12 PROCEDURE — 93244 EXT ECG>48HR<7D REV&INTERPJ: CPT | Performed by: INTERNAL MEDICINE

## 2024-11-15 ENCOUNTER — OFFICE VISIT (OUTPATIENT)
Dept: CARDIOLOGY CLINIC | Age: 54
End: 2024-11-15
Payer: COMMERCIAL

## 2024-11-15 VITALS
DIASTOLIC BLOOD PRESSURE: 78 MMHG | SYSTOLIC BLOOD PRESSURE: 120 MMHG | HEIGHT: 72 IN | TEMPERATURE: 98.2 F | BODY MASS INDEX: 23.3 KG/M2 | OXYGEN SATURATION: 95 % | WEIGHT: 172 LBS | HEART RATE: 50 BPM

## 2024-11-15 DIAGNOSIS — E78.00 PURE HYPERCHOLESTEROLEMIA: ICD-10-CM

## 2024-11-15 DIAGNOSIS — I42.9 CARDIOMYOPATHY, UNSPECIFIED TYPE (HCC): Primary | ICD-10-CM

## 2024-11-15 DIAGNOSIS — I42.8 NONISCHEMIC CARDIOMYOPATHY (HCC): ICD-10-CM

## 2024-11-15 PROCEDURE — 3074F SYST BP LT 130 MM HG: CPT | Performed by: INTERNAL MEDICINE

## 2024-11-15 PROCEDURE — 99214 OFFICE O/P EST MOD 30 MIN: CPT | Performed by: INTERNAL MEDICINE

## 2024-11-15 PROCEDURE — 3078F DIAST BP <80 MM HG: CPT | Performed by: INTERNAL MEDICINE

## 2024-11-15 RX ORDER — CARVEDILOL 12.5 MG/1
TABLET ORAL
Qty: 90 TABLET | Refills: 3 | Status: SHIPPED | OUTPATIENT
Start: 2024-11-15

## 2024-11-15 NOTE — PROGRESS NOTES
today's visit. Additional education material will be provided in after visit summary.    Check blood pressure and heart rate at home a few times per week- keep a log with dates and times and bring to office visit   Regular exercise and following a healthy diet encouraged   Follow up with me in 6 months     Scribe's attestation:  This note was scribed in the presence of Dr. Torey Woodall M.D. By Kelli Hess RN    The scribe’s documentation has been prepared under my direction and personally reviewed by me in its entirety.  I confirm that the note above accurately reflects all work, treatment, procedures, and medical decision making performed by me.    MD Torey So M.D., [facility], [facility]

## 2024-11-15 NOTE — PATIENT INSTRUCTIONS
Plan:  ~Labs- Fasting lipids and CMP  ~Repeat limited echo for EF     Cardiac medications reviewed including indications and pertinent side effects. Medication list updated at this visit.   Patient verbalizes understanding of the need for treatment and education has been provided at today's visit. Additional education material will be provided in after visit summary.    Check blood pressure and heart rate at home a few times per week- keep a log with dates and times and bring to office visit   Regular exercise and following a healthy diet encouraged   Follow up with me in 6 months

## 2025-01-01 DIAGNOSIS — E78.2 MIXED HYPERLIPIDEMIA: ICD-10-CM

## 2025-01-02 RX ORDER — EZETIMIBE 10 MG/1
10 TABLET ORAL DAILY
Qty: 90 TABLET | Refills: 2 | Status: SHIPPED | OUTPATIENT
Start: 2025-01-02

## 2025-01-02 NOTE — TELEPHONE ENCOUNTER
Spoke with pt and relayed to get fasting labs completed. He v/u and sts he will do that next week    Last Office Visit: 11/15/2024 Provider: [facility]  **Is provider OOT? Yes    Next Office Visit: 5/30/2025 Provider: [facility]      Lab orders needed? Already ordered  Encounter provider correct? Yes If not, change provider  Script changes since last refill? no    LAST LABS:   CMP:   Lab Results   Component Value Date     09/13/2024    K 3.9 09/13/2024     09/13/2024    CO2 24 09/13/2024    BUN 14 09/13/2024    CREATININE 0.6 (L) 09/13/2024    GLUCOSE 95 09/13/2024    CALCIUM 8.3 09/13/2024    BILITOT 1.6 (A) 05/17/2023    BILITOT 1.4 (A) 05/17/2023    ALKPHOS 54 05/17/2023    ALKPHOS 54 05/17/2023    AST 62 05/17/2023    AST 62 05/17/2023    ALT 79 05/17/2023    ALT 79 05/17/2023    LABGLOM >90 09/13/2024    GFRAA >60 03/04/2014    AGRATIO 1.5 03/01/2014    GLOB 2.9 03/01/2014          Lipid:   Lab Results   Component Value Date    CHOL 134 12/22/2023    TRIG 41 12/22/2023    HDL 57 12/22/2023    LDL 69 12/22/2023    VLDL 8 12/22/2023    CHOLHDLRATIO 3.2 07/20/2022       **Care Everywhere? Yes

## 2025-01-15 RX ORDER — ATORVASTATIN CALCIUM 80 MG/1
TABLET, FILM COATED ORAL
Qty: 90 TABLET | Refills: 1 | Status: SHIPPED | OUTPATIENT
Start: 2025-01-15

## 2025-01-15 ASSESSMENT — ENCOUNTER SYMPTOMS
RIGHT EYE: 0
WHEEZING: 0
SHORTNESS OF BREATH: 0
LEFT EYE: 0
HEMATOCHEZIA: 0
STRIDOR: 0
HEMATEMESIS: 0

## 2025-01-15 NOTE — PROGRESS NOTES
Interpretation:  (Date: 06/24/2024)  Rhythm: Sinus rhythm  Rate: 80 BPM  PAC's / PVC's present: PVC/PVC's  Conduction abnormalities: Incomplete Left bundle branch block (LBBB)    ECG Interpretation:  (Date: 03/04/2024)  Rhythm: Sinus rhythm  Rate: 77 BPM  PAC's / PVC's present: PVC/PVC's  Conduction abnormalities: Left bundle branch block (LBBB)    Limited Echocardiogram  (Date: 12/13/2024)    Image quality is adequate.    Left Ventricle: Reduced left ventricular systolic function with a visually estimated EF of 45 - 50%. Left ventricle size is normal. Normal wall thickness. Mild global hypokinesis present. Abnormal septal motion.    Right Ventricle: Right ventricle size is normal. Normal systolic function.    Limited Echocardiogram  (Date: 06/07/2024)  Interpretation Summary         Image quality is fair.    Left Ventricle: Reduced left ventricular systolic function with a visually estimated EF of 40 - 45%. Left ventricle is mildly dilated. Normal wall thickness. Abnormal septal motion. Mild global hypokinesis present.    Aorta: Mildly dilated aortic root. Ao root diameter is 3.8 cm.    Right Ventricle: Right ventricle size is normal. Normal systolic function.    Cardiac MRI  (Date: 02/02/2024)  Overall findings are suggestive of a non ischemic/non infiltrative   cardiomyopathy with mild reduction in LV systolic function.     -Dilated left ventricle with mildly reduced systolic function with a   calculated   ejection fraction of 45% by Emerson's method.   -Native myocardial T1 time is normal.   -Normal right ventricular size and systolic function with a calculated   ejection   fraction of 53% by Emerson's method.   -No myocardial edema by T2w imaging/mapping. (Normal myocardium/skeletal ratio    -   normal < 1.9).   -No significant intracardiac shunt. Calculated Qp:Qs:0.94 (Phase contrast   velocity encoding Ao/Pa)   -Normal myocardial resting perfusion imaging.   -On delayed enhancement imaging, there is no 
MELO

## 2025-01-17 ENCOUNTER — OFFICE VISIT (OUTPATIENT)
Dept: CARDIOLOGY CLINIC | Age: 55
End: 2025-01-17
Payer: COMMERCIAL

## 2025-01-17 VITALS
HEART RATE: 71 BPM | SYSTOLIC BLOOD PRESSURE: 110 MMHG | WEIGHT: 174.6 LBS | OXYGEN SATURATION: 95 % | BODY MASS INDEX: 23.65 KG/M2 | DIASTOLIC BLOOD PRESSURE: 62 MMHG | HEIGHT: 72 IN

## 2025-01-17 DIAGNOSIS — S35.511D: ICD-10-CM

## 2025-01-17 DIAGNOSIS — I49.3 PVC'S (PREMATURE VENTRICULAR CONTRACTIONS): ICD-10-CM

## 2025-01-17 DIAGNOSIS — I50.22 CHRONIC SYSTOLIC HEART FAILURE (HCC): ICD-10-CM

## 2025-01-17 DIAGNOSIS — I49.3 PVC (PREMATURE VENTRICULAR CONTRACTION): ICD-10-CM

## 2025-01-17 DIAGNOSIS — I49.3 FREQUENT PVCS: Primary | ICD-10-CM

## 2025-01-17 DIAGNOSIS — I42.8 NICM (NONISCHEMIC CARDIOMYOPATHY) (HCC): ICD-10-CM

## 2025-01-17 PROCEDURE — 3078F DIAST BP <80 MM HG: CPT | Performed by: INTERNAL MEDICINE

## 2025-01-17 PROCEDURE — 93000 ELECTROCARDIOGRAM COMPLETE: CPT | Performed by: INTERNAL MEDICINE

## 2025-01-17 PROCEDURE — 3074F SYST BP LT 130 MM HG: CPT | Performed by: INTERNAL MEDICINE

## 2025-01-17 PROCEDURE — 99214 OFFICE O/P EST MOD 30 MIN: CPT | Performed by: INTERNAL MEDICINE

## 2025-01-17 NOTE — PATIENT INSTRUCTIONS
Plan:     The current medical regimen is effective; continue present plan and medications.  Follow up with me as needed.

## 2025-01-21 DIAGNOSIS — E78.00 PURE HYPERCHOLESTEROLEMIA: ICD-10-CM

## 2025-01-21 LAB
ALBUMIN SERPL-MCNC: 4.3 G/DL (ref 3.4–5)
ALBUMIN/GLOB SERPL: 1.8 {RATIO} (ref 1.1–2.2)
ALP SERPL-CCNC: 58 U/L (ref 40–129)
ALT SERPL-CCNC: 39 U/L (ref 10–40)
ANION GAP SERPL CALCULATED.3IONS-SCNC: 10 MMOL/L (ref 3–16)
AST SERPL-CCNC: 28 U/L (ref 15–37)
BILIRUB SERPL-MCNC: 0.3 MG/DL (ref 0–1)
BUN SERPL-MCNC: 15 MG/DL (ref 7–20)
CALCIUM SERPL-MCNC: 9.3 MG/DL (ref 8.3–10.6)
CHLORIDE SERPL-SCNC: 109 MMOL/L (ref 99–110)
CHOLEST SERPL-MCNC: 163 MG/DL (ref 0–199)
CO2 SERPL-SCNC: 28 MMOL/L (ref 21–32)
CREAT SERPL-MCNC: 0.7 MG/DL (ref 0.9–1.3)
GFR SERPLBLD CREATININE-BSD FMLA CKD-EPI: >90 ML/MIN/{1.73_M2}
GLUCOSE SERPL-MCNC: 91 MG/DL (ref 70–99)
HDLC SERPL-MCNC: 68 MG/DL (ref 40–60)
LDLC SERPL CALC-MCNC: 84 MG/DL
POTASSIUM SERPL-SCNC: 4.5 MMOL/L (ref 3.5–5.1)
PROT SERPL-MCNC: 6.7 G/DL (ref 6.4–8.2)
SODIUM SERPL-SCNC: 147 MMOL/L (ref 136–145)
TRIGL SERPL-MCNC: 55 MG/DL (ref 0–150)
VLDLC SERPL CALC-MCNC: 11 MG/DL

## 2025-05-18 DIAGNOSIS — I42.8 NONISCHEMIC CARDIOMYOPATHY (HCC): ICD-10-CM

## 2025-05-19 RX ORDER — CARVEDILOL 12.5 MG/1
TABLET ORAL
Qty: 90 TABLET | Refills: 3 | Status: SHIPPED | OUTPATIENT
Start: 2025-05-19

## 2025-05-19 NOTE — TELEPHONE ENCOUNTER
Last Office Visit: 11/15/2024 Provider: TEREZA  **Is provider OOT? Yes    Next Office Visit: 6/27/2025 Provider: TEREZA    LAST LABS:   CMP:  Lab Results   Component Value Date     (H) 01/21/2025    K 4.5 01/21/2025     01/21/2025    CO2 28 01/21/2025    BUN 15 01/21/2025    CREATININE 0.7 (L) 01/21/2025    GLUCOSE 91 01/21/2025    CALCIUM 9.3 01/21/2025    BILITOT 0.3 01/21/2025    ALKPHOS 58 01/21/2025    AST 28 01/21/2025    ALT 39 01/21/2025    LABGLOM >90 01/21/2025    GFRAA >60 03/04/2014    AGRATIO 1.8 01/21/2025    GLOB 2.9 03/01/2014

## 2025-05-28 DIAGNOSIS — I10 HYPERTENSION, UNSPECIFIED TYPE: ICD-10-CM

## 2025-05-28 RX ORDER — VALSARTAN 160 MG/1
160 TABLET ORAL DAILY
Qty: 30 TABLET | Refills: 0 | Status: SHIPPED | OUTPATIENT
Start: 2025-05-28

## 2025-05-28 NOTE — TELEPHONE ENCOUNTER
[facility] RX pending     Last Office Visit: 11/15/2024 [place] [facility]  **Is provider [place]? No    Next Office Visit: 6/27/2025 [place] [facility]    Lab orders needed? no   Encounter provider correct? Yes If not, change provider  Script changes since last refill? no    LAST LABS:   CMP:  Lab Results   Component Value Date     (H) 01/21/2025    K 4.5 01/21/2025     01/21/2025    CO2 28 01/21/2025    BUN 15 01/21/2025    CREATININE 0.7 (L) 01/21/2025    GLUCOSE 91 01/21/2025    CALCIUM 9.3 01/21/2025    BILITOT 0.3 01/21/2025    ALKPHOS 58 01/21/2025    AST 28 01/21/2025    ALT 39 01/21/2025    LABGLOM >90 01/21/2025    GFRAA >60 03/04/2014    AGRATIO 1.8 01/21/2025    GLOB 2.9 03/01/2014

## 2025-06-26 DIAGNOSIS — I10 HYPERTENSION, UNSPECIFIED TYPE: ICD-10-CM

## 2025-06-26 NOTE — PROGRESS NOTES
9/12/2024); and Cardiac procedure (Bilateral, 9/12/2024).     Social History:   reports that he quit smoking about 6 years ago. His smoking use included cigarettes. He started smoking about 28 years ago. He has a 11 pack-year smoking history. He has never used smokeless tobacco. He reports current alcohol use of about 4.0 standard drinks of alcohol per week. He reports that he does not use drugs.     Family History:  family history includes Cardiomyopathy in his maternal grandmother; Depression in his father; Heart Attack in his maternal grandfather and mother.     Home Medications:  Prior to Admission medications    Medication Sig Start Date End Date Taking? Authorizing Provider   valsartan (DIOVAN) 160 MG tablet TAKE 1 TABLET BY MOUTH DAILY 5/28/25  Yes Torey Woodall MD   carvedilol (COREG) 12.5 MG tablet TAKE 1 TABLET BY MOUTH TWICE A DAY WITH A MEAL 5/19/25  Yes Torey Woodall MD   atorvastatin (LIPITOR) 80 MG tablet TAKE 1 TABLET BY MOUTH DAILY 1/15/25  Yes Torey Woodall MD   ezetimibe (ZETIA) 10 MG tablet TAKE 1 TABLET BY MOUTH DAILY 1/2/25  Yes Kory Bland MD   aspirin 81 MG tablet Take 1 tablet by mouth daily   Yes Jaycee Poe MD   Multiple Vitamins-Minerals (THERAPEUTIC MULTIVITAMIN-MINERALS) tablet Take 1 tablet by mouth daily   Yes Jaycee Poe MD   Omega-3 Fatty Acids (FISH OIL) 1000 MG CAPS Take 1 capsule by mouth daily   Yes Jaycee Poe MD        Allergies:  Patient has no known allergies.     Review of Systems:   Constitutional: there has been no unanticipated weight loss. There's been no change in energy level, sleep pattern, or activity level.     Eyes: No visual changes or diplopia. No scleral icterus.  ENT: No Headaches, hearing loss or vertigo. No mouth sores or sore throat.  Cardiovascular: Reviewed in HPI  Respiratory: No cough or wheezing, no sputum production. No hematemesis.    Gastrointestinal: No abdominal pain, appetite loss, blood in

## 2025-06-27 ENCOUNTER — OFFICE VISIT (OUTPATIENT)
Dept: CARDIOLOGY CLINIC | Age: 55
End: 2025-06-27
Payer: COMMERCIAL

## 2025-06-27 VITALS
OXYGEN SATURATION: 98 % | SYSTOLIC BLOOD PRESSURE: 92 MMHG | WEIGHT: 169 LBS | HEART RATE: 68 BPM | BODY MASS INDEX: 22.89 KG/M2 | HEIGHT: 72 IN | DIASTOLIC BLOOD PRESSURE: 68 MMHG

## 2025-06-27 DIAGNOSIS — I10 HYPERTENSION, UNSPECIFIED TYPE: ICD-10-CM

## 2025-06-27 DIAGNOSIS — E78.2 MIXED HYPERLIPIDEMIA: ICD-10-CM

## 2025-06-27 DIAGNOSIS — I25.10 CORONARY ARTERY DISEASE INVOLVING NATIVE CORONARY ARTERY OF NATIVE HEART WITHOUT ANGINA PECTORIS: Primary | ICD-10-CM

## 2025-06-27 DIAGNOSIS — I42.8 NONISCHEMIC CARDIOMYOPATHY (HCC): ICD-10-CM

## 2025-06-27 PROCEDURE — 99214 OFFICE O/P EST MOD 30 MIN: CPT | Performed by: INTERNAL MEDICINE

## 2025-06-27 PROCEDURE — 3078F DIAST BP <80 MM HG: CPT | Performed by: INTERNAL MEDICINE

## 2025-06-27 PROCEDURE — 3074F SYST BP LT 130 MM HG: CPT | Performed by: INTERNAL MEDICINE

## 2025-06-27 RX ORDER — VALSARTAN 160 MG/1
160 TABLET ORAL DAILY
Qty: 30 TABLET | Refills: 0 | OUTPATIENT
Start: 2025-06-27

## 2025-06-27 RX ORDER — VALSARTAN 160 MG/1
160 TABLET ORAL DAILY
Qty: 90 TABLET | Refills: 3 | Status: SHIPPED | OUTPATIENT
Start: 2025-06-27

## 2025-06-27 RX ORDER — ATORVASTATIN CALCIUM 80 MG/1
TABLET, FILM COATED ORAL
Qty: 90 TABLET | Refills: 3 | Status: SHIPPED | OUTPATIENT
Start: 2025-06-27

## 2025-06-27 NOTE — PATIENT INSTRUCTIONS
~We discussed newer medications to treat heart function- aldactone, entresto, and Jardiance. Will hold off since he feels well   ~Recommend more dedicated exercise- 30-45 minutes of sustained cardio exercise 4-5 days per week    ~Let us know if you would like to see Dr. Hanna for varicose veins.   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 Medication list updated at this visit.   Patient verbalizes understanding of the need for treatment and education has been provided at today's visit. Additional education material will be provided in after visit summary.    Check blood pressure and heart rate at home a few times per week- keep a log with dates and times and bring to office visit   Regular exercise and following a healthy diet encouraged   Follow up with me in 1 year

## (undated) DEVICE — RADIFOCUS GLIDEWIRE: Brand: GLIDEWIRE

## (undated) DEVICE — ANGIO-SEAL VIP VASCULAR CLOSURE DEVICE: Brand: ANGIO-SEAL

## (undated) DEVICE — CATH ABLATION TACTIFLEX SE BID CURVE D-F

## (undated) DEVICE — PINNACLE INTRODUCER SHEATH: Brand: PINNACLE

## (undated) DEVICE — SET TBNG L260CM IRRIG RF THER COOL PNT

## (undated) DEVICE — CATHETER REPROC EP CRD 2 5 MM DAI402004R

## (undated) DEVICE — CATHETER MAP 8FR W13XH13MMXL105CM SPC 3MM TIP 1MM L ATRIUM

## (undated) DEVICE — BASIXCOMPAK INDEFLATOR

## (undated) DEVICE — GUIDEWIRE VASC L260CM DIA0.035IN L15CM STR TIP PTFE S STL

## (undated) DEVICE — DRAPE EQUIP CVR STRL

## (undated) DEVICE — SYSTEM CLOSURE 6-12 FR VEN VASC VASCADE MVP

## (undated) DEVICE — KIT CATH EP SURFACE ELECTRODE ENSITE X PATCH

## (undated) DEVICE — EP VASCULAR PACK: Brand: MEDLINE INDUSTRIES, INC.

## (undated) DEVICE — MERITMEDICAL 5FR MOD HOOK CATHETER

## (undated) DEVICE — SUTURE PERMA-HAND SZ 0 L30IN NONABSORBABLE BLK L36MM CT-1 424H

## (undated) DEVICE — DISP SOLID ADULT RETURN ELECTRODE W/3M CABLE 50/BX: Brand: BOVIE

## (undated) DEVICE — RADIFOCUS GLIDECATH: Brand: GLIDECATH

## (undated) DEVICE — Device

## (undated) DEVICE — INTRODUCER SHTH 8FR L23CM DIL TIP L35MM BLU SIL COEXTRUDED